# Patient Record
Sex: MALE | Race: WHITE | NOT HISPANIC OR LATINO | Employment: OTHER | URBAN - METROPOLITAN AREA
[De-identification: names, ages, dates, MRNs, and addresses within clinical notes are randomized per-mention and may not be internally consistent; named-entity substitution may affect disease eponyms.]

---

## 2019-06-07 ENCOUNTER — TELEPHONE (OUTPATIENT)
Dept: SCHEDULING | Facility: IMAGING CENTER | Age: 69
End: 2019-06-07

## 2019-06-13 ENCOUNTER — TELEPHONE (OUTPATIENT)
Dept: MEDICAL GROUP | Facility: MEDICAL CENTER | Age: 69
End: 2019-06-13

## 2019-06-13 NOTE — TELEPHONE ENCOUNTER
----- Message from Deanne Sagastume sent at 6/13/2019 12:29 PM PDT -----  Regarding: FW: Health records  Contact: [phone]      ----- Message -----  From: Isaias Dover  Sent: 6/13/2019  12:16 PM  To: Marianela Mills  Subject: Health records                                   Topic: Bill/Statement    I am a new patient of yours with an initial visit scheduled for July 23rd at 2pm.   I am a new resident of [place], having moved from [place], [place].   You will be receiving via fax my medical records from my [place] Primary Physician  Doctor and Dermatologist.   Any questions feel free to contact me [phone].   Thank you,   Isaias

## 2019-06-26 ENCOUNTER — TELEPHONE (OUTPATIENT)
Dept: MEDICAL GROUP | Facility: LAB | Age: 69
End: 2019-06-26

## 2019-06-26 NOTE — TELEPHONE ENCOUNTER
1. Caller Name: Isaias Dover      Call Back Number: [phone] (home)         Patient approves a detailed voicemail message: N\A    Patient called and LVM stating he going to be establishing care in July on the 23, He is still in [place] until early July. He called in regards to getting a referral placed for a podiatrists in [place], he stated about a ingrown toenail.

## 2019-07-03 ENCOUNTER — OFFICE VISIT (OUTPATIENT)
Dept: MEDICAL GROUP | Facility: MEDICAL CENTER | Age: 69
End: 2019-07-03
Payer: MEDICARE

## 2019-07-03 VITALS
WEIGHT: 178.2 LBS | SYSTOLIC BLOOD PRESSURE: 132 MMHG | TEMPERATURE: 98.4 F | RESPIRATION RATE: 16 BRPM | HEART RATE: 71 BPM | OXYGEN SATURATION: 96 % | BODY MASS INDEX: 27.01 KG/M2 | HEIGHT: 68 IN | DIASTOLIC BLOOD PRESSURE: 80 MMHG

## 2019-07-03 DIAGNOSIS — H91.8X3 OTHER SPECIFIED HEARING LOSS OF BOTH EARS: ICD-10-CM

## 2019-07-03 DIAGNOSIS — I10 ESSENTIAL HYPERTENSION: ICD-10-CM

## 2019-07-03 DIAGNOSIS — Z96.641 HISTORY OF TOTAL RIGHT HIP REPLACEMENT: ICD-10-CM

## 2019-07-03 DIAGNOSIS — L60.0 INGROWN NAIL: ICD-10-CM

## 2019-07-03 DIAGNOSIS — Z00.00 PREVENTATIVE HEALTH CARE: ICD-10-CM

## 2019-07-03 DIAGNOSIS — Z12.5 ENCOUNTER FOR SCREENING FOR MALIGNANT NEOPLASM OF PROSTATE: ICD-10-CM

## 2019-07-03 DIAGNOSIS — Z87.19 HISTORY OF PYLORIC STENOSIS AS A CHILD: ICD-10-CM

## 2019-07-03 DIAGNOSIS — E78.5 DYSLIPIDEMIA: ICD-10-CM

## 2019-07-03 DIAGNOSIS — E11.8 TYPE 2 DIABETES MELLITUS WITH COMPLICATION, WITHOUT LONG-TERM CURRENT USE OF INSULIN (HCC): ICD-10-CM

## 2019-07-03 PROBLEM — H91.93 BILATERAL HEARING LOSS: Status: ACTIVE | Noted: 2019-07-03

## 2019-07-03 PROCEDURE — 99204 OFFICE O/P NEW MOD 45 MIN: CPT | Performed by: INTERNAL MEDICINE

## 2019-07-03 RX ORDER — HYDROCHLOROTHIAZIDE 12.5 MG/1
12.5 CAPSULE, GELATIN COATED ORAL EVERY MORNING
COMMUNITY
End: 2019-12-04 | Stop reason: SDUPTHER

## 2019-07-03 RX ORDER — LISINOPRIL 40 MG/1
40 TABLET ORAL DAILY
COMMUNITY
End: 2019-12-04 | Stop reason: SDUPTHER

## 2019-07-03 RX ORDER — ROSUVASTATIN CALCIUM 20 MG/1
20 TABLET, COATED ORAL EVERY EVENING
COMMUNITY
End: 2019-12-04 | Stop reason: SDUPTHER

## 2019-07-03 ASSESSMENT — PATIENT HEALTH QUESTIONNAIRE - PHQ9: CLINICAL INTERPRETATION OF PHQ2 SCORE: 0

## 2019-07-03 NOTE — PROGRESS NOTES
CC:  Diagnoses of Essential hypertension, Type 2 diabetes mellitus with complication, without long-term current use of insulin (HCC), Ingrown nail, Preventative health care, Encounter for screening for malignant neoplasm of prostate, Dyslipidemia, History of total right hip replacement, Other specified hearing loss of both ears, and History of pyloric stenosis as a child were pertinent to this visit.    HISTORY OF THE PRESENT ILLNESS: Patient is a 69 y.o. male. This pleasant patient is here today to establish care he recently moved here from [place].    He indicates he just had his labs done and saw his primary care doctor in [place] on 6/17/2019.  These records have been requested.  He says he did stool cards for colon cancer screening.    Patient says his diabetes is well controlled, he denies any complications.  Has ingrown toenail on the right hallux, saw podiatry in [place] was pending treatment but needed to move here.  With weight loss of approximately 20 pounds, intentionally, he is off Januvia and now just on the metformin.  Indicates that sugars have been reasonably well controlled at home.  Says his eye exam was 3/2019 and was normal.  No hypoglycemia.    History of bilateral hearing loss due to febrile illness at age 7 that was associated with seizure at that time only.  He has had hearing aids since he was a child.    Occasional cigar, he understands no amount of nicotine safe, he will consider forgoing the cigar at his son's pending wedding.  He denies any pulmonary symptoms.  1 to 2 glasses of wine usually night    Allergies: Patient has no allergy information on record.    Current Outpatient Prescriptions Ordered in [facility]   Medication Sig Dispense Refill   • metFORMIN (GLUCOPHAGE) 500 MG Tab Take 500 mg by mouth 2 times a day, with meals.     • lisinopril (PRINIVIL, ZESTRIL) 40 MG tablet Take 40 mg by mouth every day.     • hydrochlorothiazide (MICROZIDE) 12.5 MG capsule Take 12.5 mg by  mouth every morning.     • rosuvastatin (CRESTOR) 20 MG Tab Take 20 mg by mouth every evening.     • aspirin 81 MG tablet Take 81 mg by mouth every day.     • Multiple Vitamins-Minerals (CENTRUM SILVER 50+MEN) Tab Take  by mouth.       No current Epic-ordered facility-administered medications on file.        Past Medical History:   Diagnosis Date   • Diabetes (HCC)    • Hyperlipidemia    • Hypertension        Past Surgical History:   Procedure Laterality Date   • ATHROPLASTY Right     right high    • HERNIA REPAIR      bilateral inguinal, 4 total starting at age 7 thru age 46   • OTHER      sx for pyloric stenosis at 6wk of age       Social History   Substance Use Topics   • Smoking status: Light Tobacco Smoker     Types: Cigars   • Smokeless tobacco: Never Used      Comment: occ.    • Alcohol use 4.2 - 8.4 oz/week     7 - 14 Glasses of wine per week       Social History     Social History Narrative   • No narrative on file       Family History   Problem Relation Age of Onset   • Hypertension Mother    • Hyperlipidemia Mother    • Heart Disease Father    • Hypertension Father    • Cancer Paternal Grandfather         oral cancer/tobacco       ROS:     - Constitutional: Negative for fever, chills, unexpected weight change, night sweats    - Eyes:   Negative for blurry vision, eye pain, discharge    - ENT:  Negative for hearing changes, ear pain, ear discharge, rhinorrhea, sinus congestion, sore throat     - Respiratory: Negative for cough, sputum production, chest congestion, dyspnea, wheezing, and crackles.      - Cardiovascular: Negative for chest pain, palpitations, orthopnea, and bilateral lower extremity edema.     - Gastrointestinal: Negative for heartburn, nausea, vomiting, abdominal pain, hematochezia, melena, diarrhea, constipation, and greasy/foul-smelling stools.     - Genitourinary: Negative for dysuria, polyuria, hematuria, pyuria, urinary urgency, and urinary incontinence.     -  "Musculoskeletal:toenail/hallux as per hpi    - Skin: Negative for rash, itching, cyanotic skin color change.     - Neurological: Negative for migraines, numbness, ataxia, tremors, vertigo    - Endo:Negative for polyuria, heat/cold intolerance, excessive thirst    - Hem/lymphatic: Negative for easy bruising, blood clots, lymphedema, swollen glands    -Allergic/immun: egative for allergic rhinitis    - Psychiatric/Behavioral: Negative for depression, suicidal/homicidal ideation and memory loss.      Exam: /80 (BP Location: Left arm, Patient Position: Sitting, BP Cuff Size: Adult)   Pulse 71   Temp 36.9 °C (98.4 °F) (Temporal)   Resp 16   Ht 1.715 m (5' 7.52\")   Wt 80.8 kg (178 lb 3.2 oz)   SpO2 96%  Body mass index is 27.48 kg/m².    General: Normal appearing. No distress.  EYES: Conjunctiva clear lids without ptosis, pupils equal  EARS: Normal shape and contour   NOSE, THROAT: nasal mucosa benign. oropharynx is without erythema, edema or exudates.   Neck: Supple without LAD. Thyroid is not enlarged.  Pulmonary: Clear to ausculation.  Normal effort. No rales or wheezing.  Cardiovascular: Regular rate and rhythm without significant murmur.   Abdomen: Soft, nontender, nondistended. Normal bowel sounds.  Neurologic: Cranial nerves grossly nonfocal.  Monofilament testing intact throughout,   Lymph: No cervical, supraclavicular nodes palpable  Skin: Warm and dry.  No obvious lesions.  Musculoskeletal: Normal gait. No extremity cyanosis, clubbing, or edema. distal pedal and posterior tibial pulses intact.  No foot sores.  There is a little bit of ingrown toenail on the right hallux laterally.  There is no associated redness or signs of cellulitis  Psych: Normal mood and affect. Alert and oriented x3. Judgment and insight is normal.    Assessment/Plan  1. Essential hypertension  Historically stable, controlled medical condition continue hydrochlorothiazide 12.5 mg daily and lisinopril 40 mg daily.    2. Type 2 " diabetes mellitus with complication, without long-term current use of insulin (HCC)  Patient indicates this is a controlled medical condition on metformin.  Retinal exam -3/19 per patient.  Monofilament testing today 7/3/2019 normal.  Prior records have been requested, he says he just had labs done in June, patient advised for medication refills we will need labs in our system.  - CBC WITH DIFFERENTIAL; Future  - Comp Metabolic Panel; Future  - Lipid Profile; Future  - HEMOGLOBIN A1C; Future  - REFERRAL TO OPHTHALMOLOGY    3. Ingrown nail  Recommend tea tree oil in the interim.  - REFERRAL TO PODIATRY    4. Preventative health care  - CBC WITH DIFFERENTIAL; Future  - Comp Metabolic Panel; Future  - Lipid Profile; Future  - VITAMIN D,25 HYDROXY; Future    5. Encounter for screening for malignant neoplasm of prostate  - PROSTATE SPECIFIC AG SCREENING; Future    6. Dyslipidemia  Historically stable, well controlled issue continue rosuvastatin 20 mg daily.    7. History of total right hip replacement  Stable, prior issue, no symptoms.    8. Other specified hearing loss of both ears  Stable, chronic issue, uses hearing aids.    9. History of pyloric stenosis as a child  This is a remote issue that resolved after surgery as a child.  Asymptomatic.        Return to clinic 6 months or sooner if needed      Please note that this dictation was created using voice recognition software. I have made every reasonable attempt to correct obvious errors, but I expect that there are errors of grammar and possibly content that I did not discover before finalizing the note.

## 2019-10-29 ENCOUNTER — HOSPITAL ENCOUNTER (OUTPATIENT)
Dept: LAB | Facility: MEDICAL CENTER | Age: 69
End: 2019-10-29
Attending: INTERNAL MEDICINE
Payer: MEDICARE

## 2019-10-29 DIAGNOSIS — Z00.00 PREVENTATIVE HEALTH CARE: ICD-10-CM

## 2019-10-29 DIAGNOSIS — Z12.5 ENCOUNTER FOR SCREENING FOR MALIGNANT NEOPLASM OF PROSTATE: ICD-10-CM

## 2019-10-29 DIAGNOSIS — E11.8 TYPE 2 DIABETES MELLITUS WITH COMPLICATION, WITHOUT LONG-TERM CURRENT USE OF INSULIN (HCC): ICD-10-CM

## 2019-10-29 LAB
25(OH)D3 SERPL-MCNC: 29 NG/ML (ref 30–100)
ALBUMIN SERPL BCP-MCNC: 4.8 G/DL (ref 3.2–4.9)
ALBUMIN/GLOB SERPL: 1.5 G/DL
ALP SERPL-CCNC: 56 U/L (ref 30–99)
ALT SERPL-CCNC: 50 U/L (ref 2–50)
ANION GAP SERPL CALC-SCNC: 12 MMOL/L (ref 0–11.9)
AST SERPL-CCNC: 40 U/L (ref 12–45)
BASOPHILS # BLD AUTO: 0.5 % (ref 0–1.8)
BASOPHILS # BLD: 0.03 K/UL (ref 0–0.12)
BILIRUB SERPL-MCNC: 0.7 MG/DL (ref 0.1–1.5)
BUN SERPL-MCNC: 16 MG/DL (ref 8–22)
CALCIUM SERPL-MCNC: 9.9 MG/DL (ref 8.5–10.5)
CHLORIDE SERPL-SCNC: 102 MMOL/L (ref 96–112)
CHOLEST SERPL-MCNC: 162 MG/DL (ref 100–199)
CO2 SERPL-SCNC: 25 MMOL/L (ref 20–33)
CREAT SERPL-MCNC: 0.81 MG/DL (ref 0.5–1.4)
EOSINOPHIL # BLD AUTO: 0.02 K/UL (ref 0–0.51)
EOSINOPHIL NFR BLD: 0.3 % (ref 0–6.9)
ERYTHROCYTE [DISTWIDTH] IN BLOOD BY AUTOMATED COUNT: 42.1 FL (ref 35.9–50)
FASTING STATUS PATIENT QL REPORTED: NORMAL
GLOBULIN SER CALC-MCNC: 3.1 G/DL (ref 1.9–3.5)
GLUCOSE SERPL-MCNC: 104 MG/DL (ref 65–99)
HCT VFR BLD AUTO: 45.8 % (ref 42–52)
HDLC SERPL-MCNC: 52 MG/DL
HGB BLD-MCNC: 15.3 G/DL (ref 14–18)
IMM GRANULOCYTES # BLD AUTO: 0.02 K/UL (ref 0–0.11)
IMM GRANULOCYTES NFR BLD AUTO: 0.3 % (ref 0–0.9)
LDLC SERPL CALC-MCNC: 88 MG/DL
LYMPHOCYTES # BLD AUTO: 1.61 K/UL (ref 1–4.8)
LYMPHOCYTES NFR BLD: 25.3 % (ref 22–41)
MCH RBC QN AUTO: 30.7 PG (ref 27–33)
MCHC RBC AUTO-ENTMCNC: 33.4 G/DL (ref 33.7–35.3)
MCV RBC AUTO: 92 FL (ref 81.4–97.8)
MONOCYTES # BLD AUTO: 0.46 K/UL (ref 0–0.85)
MONOCYTES NFR BLD AUTO: 7.2 % (ref 0–13.4)
NEUTROPHILS # BLD AUTO: 4.23 K/UL (ref 1.82–7.42)
NEUTROPHILS NFR BLD: 66.4 % (ref 44–72)
NRBC # BLD AUTO: 0 K/UL
NRBC BLD-RTO: 0 /100 WBC
PLATELET # BLD AUTO: 253 K/UL (ref 164–446)
PMV BLD AUTO: 10.4 FL (ref 9–12.9)
POTASSIUM SERPL-SCNC: 4.1 MMOL/L (ref 3.6–5.5)
PROT SERPL-MCNC: 7.9 G/DL (ref 6–8.2)
PSA SERPL-MCNC: 5.61 NG/ML (ref 0–4)
RBC # BLD AUTO: 4.98 M/UL (ref 4.7–6.1)
SODIUM SERPL-SCNC: 139 MMOL/L (ref 135–145)
TRIGL SERPL-MCNC: 111 MG/DL (ref 0–149)
WBC # BLD AUTO: 6.4 K/UL (ref 4.8–10.8)

## 2019-10-29 PROCEDURE — 85025 COMPLETE CBC W/AUTO DIFF WBC: CPT

## 2019-10-29 PROCEDURE — 80061 LIPID PANEL: CPT

## 2019-10-29 PROCEDURE — 80053 COMPREHEN METABOLIC PANEL: CPT

## 2019-10-29 PROCEDURE — 82306 VITAMIN D 25 HYDROXY: CPT | Mod: GA

## 2019-10-29 PROCEDURE — 36415 COLL VENOUS BLD VENIPUNCTURE: CPT | Mod: GA

## 2019-10-29 PROCEDURE — 84153 ASSAY OF PSA TOTAL: CPT | Mod: GA

## 2019-10-29 PROCEDURE — 83036 HEMOGLOBIN GLYCOSYLATED A1C: CPT | Mod: GA

## 2019-10-30 DIAGNOSIS — R97.20 ELEVATED PSA: ICD-10-CM

## 2019-10-30 LAB
EST. AVERAGE GLUCOSE BLD GHB EST-MCNC: 157 MG/DL
HBA1C MFR BLD: 7.1 % (ref 0–5.6)

## 2019-12-03 ENCOUNTER — HOSPITAL ENCOUNTER (OUTPATIENT)
Dept: LAB | Facility: MEDICAL CENTER | Age: 69
End: 2019-12-03
Attending: INTERNAL MEDICINE
Payer: MEDICARE

## 2019-12-03 DIAGNOSIS — R97.20 ELEVATED PSA: ICD-10-CM

## 2019-12-03 PROCEDURE — 84153 ASSAY OF PSA TOTAL: CPT

## 2019-12-03 PROCEDURE — 36415 COLL VENOUS BLD VENIPUNCTURE: CPT

## 2019-12-03 PROCEDURE — 84154 ASSAY OF PSA FREE: CPT

## 2019-12-04 ENCOUNTER — OFFICE VISIT (OUTPATIENT)
Dept: MEDICAL GROUP | Facility: MEDICAL CENTER | Age: 69
End: 2019-12-04
Payer: MEDICARE

## 2019-12-04 VITALS
DIASTOLIC BLOOD PRESSURE: 62 MMHG | WEIGHT: 186.2 LBS | HEIGHT: 68 IN | BODY MASS INDEX: 28.22 KG/M2 | HEART RATE: 71 BPM | RESPIRATION RATE: 16 BRPM | TEMPERATURE: 98.6 F | SYSTOLIC BLOOD PRESSURE: 162 MMHG | OXYGEN SATURATION: 95 %

## 2019-12-04 DIAGNOSIS — E78.5 DYSLIPIDEMIA: ICD-10-CM

## 2019-12-04 DIAGNOSIS — I10 ESSENTIAL HYPERTENSION: ICD-10-CM

## 2019-12-04 DIAGNOSIS — Z23 NEED FOR VACCINATION: ICD-10-CM

## 2019-12-04 DIAGNOSIS — R97.20 ELEVATED PSA: ICD-10-CM

## 2019-12-04 DIAGNOSIS — E11.8 TYPE 2 DIABETES MELLITUS WITH COMPLICATION, WITHOUT LONG-TERM CURRENT USE OF INSULIN (HCC): ICD-10-CM

## 2019-12-04 PROCEDURE — 99214 OFFICE O/P EST MOD 30 MIN: CPT | Mod: 25 | Performed by: INTERNAL MEDICINE

## 2019-12-04 PROCEDURE — 90715 TDAP VACCINE 7 YRS/> IM: CPT | Performed by: INTERNAL MEDICINE

## 2019-12-04 PROCEDURE — 90471 IMMUNIZATION ADMIN: CPT | Performed by: INTERNAL MEDICINE

## 2019-12-04 RX ORDER — ROSUVASTATIN CALCIUM 20 MG/1
20 TABLET, COATED ORAL EVERY EVENING
Qty: 90 TAB | Refills: 3 | Status: SHIPPED | OUTPATIENT
Start: 2019-12-04 | End: 2020-12-06

## 2019-12-04 RX ORDER — HYDROCHLOROTHIAZIDE 12.5 MG/1
12.5 CAPSULE, GELATIN COATED ORAL EVERY MORNING
Qty: 90 CAP | Refills: 3 | Status: SHIPPED | OUTPATIENT
Start: 2019-12-04 | End: 2020-01-14 | Stop reason: SDUPTHER

## 2019-12-04 RX ORDER — LISINOPRIL 40 MG/1
40 TABLET ORAL DAILY
Qty: 90 TAB | Refills: 3 | Status: SHIPPED | OUTPATIENT
Start: 2019-12-04 | End: 2020-12-06

## 2019-12-04 NOTE — PROGRESS NOTES
"CC:  Diagnoses of Need for vaccination, Essential hypertension, Type 2 diabetes mellitus with complication, without long-term current use of insulin (HCC), Dyslipidemia, and Elevated PSA were pertinent to this visit.    HISTORY OF THE PRESENT ILLNESS: Patient is a 69 y.o. male. This pleasant patient is here today to f/u.    Patient has brought in his glucose averages, month of September 159, October 178, November 159.  No sugar lows, foot sores or neuropathy.  He actually is taking metformin thousand milligrams twice daily.  He is interested in adding Jardiance.  A1c on labs 7.1 on 10/29/2019.    Labs 10/9/2019 total cholesterol 162, triglycerides 111, HDL 52, LDL 88.  He is tolerating rosuvastatin very well.    Blood pressure was elevated today in clinic.  He states compliance with lisinopril, hydrochlorothiazide.  He is not on any \"nonsedating \"over-the-counter medications.  He tries to limit adding salt to his food.  Weight has remained the same.  Denies any cardiopulmonary symptoms, plays golf without any angina on exertion.    Elevated PSA on labs 10/29/2019 level 5.61.  His repeat lab is pending with the free and total portion.  He denies any trouble with urinary evacuation, no stream issues, no dysuria.  Does say sometimes he has some dribbling post void which is not new.    Vitamin D level 29, he is taking a supplement.    Allergies: Patient has no known allergies.    Current Outpatient Medications Ordered in Epic   Medication Sig Dispense Refill   • lisinopril (PRINIVIL) 40 MG tablet Take 1 Tab by mouth every day. 90 Tab 3   • rosuvastatin (CRESTOR) 20 MG Tab Take 1 Tab by mouth every evening. 90 Tab 3   • hydrochlorothiazide (MICROZIDE) 12.5 MG capsule Take 1 Cap by mouth every morning. 90 Cap 3   • metformin (GLUCOPHAGE) 1000 MG tablet Take 1 Tab by mouth 2 times a day, with meals. 180 Tab 1   • Empagliflozin (JARDIANCE) 10 MG Tab Take 1 Tab by mouth every day. 90 Tab 1   • aspirin 81 MG tablet Take 81 mg " by mouth every day.     • Multiple Vitamins-Minerals (CENTRUM SILVER 50+MEN) Tab Take  by mouth.       No current Epic-ordered facility-administered medications on file.        Past Medical History:   Diagnosis Date   • Diabetes (HCC)    • Hyperlipidemia    • Hypertension        Past Surgical History:   Procedure Laterality Date   • ATHROPLASTY Right     right high    • HERNIA REPAIR      bilateral inguinal, 4 total starting at age 7 thru age 46   • OTHER      sx for pyloric stenosis at 6wk of age       Social History     Tobacco Use   • Smoking status: Light Tobacco Smoker     Packs/day: 0.00     Types: Cigars   • Smokeless tobacco: Never Used   • Tobacco comment: occ.    Substance Use Topics   • Alcohol use: Yes     Alcohol/week: 4.2 - 8.4 oz     Types: 7 - 14 Glasses of wine per week   • Drug use: No       Social History     Patient does not qualify to have social determinant information on file (likely too young).   Social History Narrative   • Not on file       Family History   Problem Relation Age of Onset   • Hypertension Mother    • Hyperlipidemia Mother    • Heart Disease Father    • Hypertension Father    • Cancer Paternal Grandfather         oral cancer/tobacco       ROS:     - Constitutional: Negative for fever, chills, unexpected weight change, night sweats    - Eyes:   Negative for blurry vision, eye pain, discharge    - ENT:  Negative for hearing changes, ear pain, ear discharge, rhinorrhea, sinus congestion, sore throat     - Respiratory: Negative for cough, sputum production, chest congestion, dyspnea, wheezing, and crackles.      - Cardiovascular: Negative for chest pain, palpitations, orthopnea, and bilateral lower extremity edema.     - Gastrointestinal: Negative for heartburn, nausea, vomiting, abdominal pain, hematochezia, melena, diarrhea, constipation, and greasy/foul-smelling stools.     - Genitourinary: Negative for dysuria, polyuria, hematuria, pyuria, urinary urgency, and urinary  "incontinence.     - Musculoskeletal: Negative for myalgias, back pain, and joint pain.     - Skin: Negative for rash, itching, cyanotic skin color change.     - Neurological: Negative for migraines, numbness, ataxia, tremors, vertigo    - Endo:Negative for polyuria, heat/cold intolerance, excessive thirst    - Hem/lymphatic: Negative for easy bruising, blood clots, lymphedema, swollen glands    -Allergic/immun: Negative for allergic rhinitis    - Psychiatric/Behavioral: Negative for depression, suicidal/homicidal ideation and memory loss.      Exam: BP (!) 172/90 (BP Location: Left arm, Patient Position: Sitting, BP Cuff Size: Adult)   Pulse 71   Temp 37 °C (98.6 °F) (Temporal)   Resp 16   Ht 1.715 m (5' 7.52\")   Wt 84.5 kg (186 lb 3.2 oz)   SpO2 95%  Body mass index is 28.72 kg/m².    General: Normal appearing. No distress.  EYES: Conjunctiva clear lids without ptosis, pupils equal  EARS: Normal shape and contour   NOSE, THROAT: nasal mucosa benign. oropharynx is without erythema, edema or exudates.   Neck: Supple without LAD. Thyroid is not enlarged.  Pulmonary: Clear to ausculation.  Normal effort. No rales or wheezing.  Cardiovascular: Regular rate and rhythm without significant murmur.   Abdomen: Soft, nontender, nondistended. Normal bowel sounds.  Neurologic: Cranial nerves grossly nonfocal  Lymph: No cervical, supraclavicular nodes palpable  Skin: Warm and dry.  No obvious lesions.  Musculoskeletal: Normal gait. No extremity cyanosis, clubbing, or edema.  Psych: Normal mood and affect. Alert and oriented x3. Judgment and insight is normal.        Assessment/Plan  1. Need for vaccination  - TDAP VACCINE =>8YO IM    2. Essential hypertension  Recheck blood pressure did decrease significantly.  Patient will check his blood pressure at home and report measurements to me by chart email.  Patient did not wish to increase medication at this time but will consider next appointment blood pressure remains " elevated.  Overall stable chronic issue.  - lisinopril (PRINIVIL) 40 MG tablet; Take 1 Tab by mouth every day.  Dispense: 90 Tab; Refill: 3  - hydrochlorothiazide (MICROZIDE) 12.5 MG capsule; Take 1 Cap by mouth every morning.  Dispense: 90 Cap; Refill: 3  - Basic Metabolic Panel; Future    3. Type 2 diabetes mellitus with complication, without long-term current use of insulin (HCC)  Not optimally controlled we will add SGLT2 type medication follow-up 1 month.  Advised patient stay very hydrated.  Advised patient not to let any urine sit on his skin or risk of mycotic genital infections discussed him in signs and symptoms to monitor for.  - HEMOGLOBIN A1C; Future  - MICROALBUMIN CREAT RATIO URINE; Future  - Basic Metabolic Panel; Future  - metformin (GLUCOPHAGE) 1000 MG tablet; Take 1 Tab by mouth 2 times a day, with meals.  Dispense: 180 Tab; Refill: 1  - Empagliflozin (JARDIANCE) 10 MG Tab; Take 1 Tab by mouth every day.  Dispense: 90 Tab; Refill: 1    4. Dyslipidemia  Stable, chronic controlled condition continue rosuvastatin  - rosuvastatin (CRESTOR) 20 MG Tab; Take 1 Tab by mouth every evening.  Dispense: 90 Tab; Refill: 3    5. Elevated PSA  Pending repeat lab with free and total levels, if elevated will refer to urology.  Otherwise will perform rectal exam next appointment if returns normal.      Return to clinic in January        Please note that this dictation was created using voice recognition software. I have made every reasonable attempt to correct obvious errors, but I expect that there are errors of grammar and possibly content that I did not discover before finalizing the note.

## 2019-12-05 LAB
PSA FREE MFR SERPL: 10 %
PSA FREE SERPL-MCNC: 0.4 NG/ML
PSA SERPL-MCNC: 4.1 NG/ML (ref 0–4)

## 2020-01-14 ENCOUNTER — OFFICE VISIT (OUTPATIENT)
Dept: MEDICAL GROUP | Facility: MEDICAL CENTER | Age: 70
End: 2020-01-14
Payer: MEDICARE

## 2020-01-14 VITALS
TEMPERATURE: 98.5 F | DIASTOLIC BLOOD PRESSURE: 76 MMHG | WEIGHT: 183.6 LBS | RESPIRATION RATE: 16 BRPM | OXYGEN SATURATION: 96 % | SYSTOLIC BLOOD PRESSURE: 142 MMHG | HEIGHT: 68 IN | HEART RATE: 79 BPM | BODY MASS INDEX: 27.83 KG/M2

## 2020-01-14 DIAGNOSIS — I10 ESSENTIAL HYPERTENSION: ICD-10-CM

## 2020-01-14 DIAGNOSIS — E13.319 RETINOPATHY DUE TO SECONDARY DM (HCC): ICD-10-CM

## 2020-01-14 DIAGNOSIS — R97.20 ELEVATED PSA: ICD-10-CM

## 2020-01-14 DIAGNOSIS — E11.8 TYPE 2 DIABETES MELLITUS WITH COMPLICATION, WITHOUT LONG-TERM CURRENT USE OF INSULIN (HCC): ICD-10-CM

## 2020-01-14 DIAGNOSIS — Z23 NEED FOR VACCINATION: ICD-10-CM

## 2020-01-14 DIAGNOSIS — Z12.11 SCREENING FOR COLON CANCER: ICD-10-CM

## 2020-01-14 PROCEDURE — 90670 PCV13 VACCINE IM: CPT | Performed by: INTERNAL MEDICINE

## 2020-01-14 PROCEDURE — G0009 ADMIN PNEUMOCOCCAL VACCINE: HCPCS | Performed by: INTERNAL MEDICINE

## 2020-01-14 PROCEDURE — 99214 OFFICE O/P EST MOD 30 MIN: CPT | Mod: 25 | Performed by: INTERNAL MEDICINE

## 2020-01-14 RX ORDER — HYDROCHLOROTHIAZIDE 12.5 MG/1
12.5 CAPSULE, GELATIN COATED ORAL EVERY MORNING
Qty: 90 CAP | Refills: 3 | Status: SHIPPED | OUTPATIENT
Start: 2020-01-14 | End: 2021-02-26

## 2020-01-14 RX ORDER — LANCETS 30 GAUGE
EACH MISCELLANEOUS
Qty: 100 EACH | Refills: 0 | Status: SHIPPED | OUTPATIENT
Start: 2020-01-14 | End: 2024-02-07 | Stop reason: SDUPTHER

## 2020-01-14 RX ORDER — AMLODIPINE BESYLATE 5 MG/1
5 TABLET ORAL DAILY
Qty: 90 TAB | Refills: 3 | Status: SHIPPED | OUTPATIENT
Start: 2020-01-14 | End: 2020-12-16

## 2020-01-14 ASSESSMENT — PATIENT HEALTH QUESTIONNAIRE - PHQ9: CLINICAL INTERPRETATION OF PHQ2 SCORE: 0

## 2020-01-14 NOTE — PROGRESS NOTES
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    HISTORY OF THE PRESENT ILLNESS: Patient is a 69 y.o. male. This pleasant patient is here today to follow-up.      Last appointment we started Jardiance and this has been tolerated really well.  He is staying hydrated.  No problems with genital mycotic infections.  Overall his weight is trending down a little bit, and so is his blood pressure.  Overall blood pressure remains elevated, similar to clinic.  He is on maximum dose lisinopril also hydrochlorothiazide.  He does not recall ever having leg swelling prior to hydrochlorothiazide and he has no bothersome urinary frequency.  No foot sores or neuropathy.  No hypoglycemia or cardiopulmonary symptoms that he is concerned about.  He had his retinal eye exam 12/5/2019 Dr. Bello which showed no retinopathy in the left eye but mild retinopathy in the right eye.    For health maintenance he will get his pneumococcal vaccine updated today.  He thinks he is due for colonoscopy, this will be coordinated for spring.  He has no current GI symptom concerns.  States he did have hepatitis B vaccine while living in [place].    With the recent elevated PSA he is pending urology appointment today.    Allergies: Patient has no known allergies.    Current Outpatient Medications Ordered in Epic   Medication Sig Dispense Refill   • Empagliflozin (JARDIANCE) 25 MG Tab Take 1 Tab by mouth every day. 90 Tab 3   • amLODIPine (NORVASC) 5 MG Tab Take 1 Tab by mouth every day. 90 Tab 3   • hydrochlorothiazide (MICROZIDE) 12.5 MG capsule Take 1 Cap by mouth every morning. 90 Cap 3   • Blood Glucose Meter Kit Test blood sugar as recommended by provider. One Touch Ultra blood glucose monitoring kit. 1 Kit 0   • Blood  Glucose Test Strips Use one One Touch Ultra strip to test blood sugar once daily . 100 Strip 0   • Lancets Use one One Touch Ultra lancet to test blood sugar once daily . 100 Each 0   • lisinopril (PRINIVIL) 40 MG tablet Take 1 Tab by mouth every day. 90 Tab 3   • rosuvastatin (CRESTOR) 20 MG Tab Take 1 Tab by mouth every evening. 90 Tab 3   • metformin (GLUCOPHAGE) 1000 MG tablet Take 1 Tab by mouth 2 times a day, with meals. 180 Tab 1   • aspirin 81 MG tablet Take 81 mg by mouth every day.     • Multiple Vitamins-Minerals (CENTRUM SILVER 50+MEN) Tab Take  by mouth.       No current Epic-ordered facility-administered medications on file.        Past Medical History:   Diagnosis Date   • Diabetes (HCC)    • Hyperlipidemia    • Hypertension        Past Surgical History:   Procedure Laterality Date   • ATHROPLASTY Right     right high    • HERNIA REPAIR      bilateral inguinal, 4 total starting at age 7 thru age 46   • OTHER      sx for pyloric stenosis at 6wk of age       Social History     Tobacco Use   • Smoking status: Light Tobacco Smoker     Packs/day: 0.00     Types: Cigars   • Smokeless tobacco: Never Used   • Tobacco comment: occ.    Substance Use Topics   • Alcohol use: Yes     Alcohol/week: 4.2 - 8.4 oz     Types: 7 - 14 Glasses of wine per week   • Drug use: No       Social History     Patient does not qualify to have social determinant information on file (likely too young).   Social History Narrative   • Not on file       Family History   Problem Relation Age of Onset   • Hypertension Mother    • Hyperlipidemia Mother    • Heart Disease Father    • Hypertension Father    • Cancer Paternal Grandfather         oral cancer/tobacco       ROS:     - Constitutional: Negative for fever, chills, unexpected weight change, night sweats    - Eyes:   Negative for blurry vision, eye pain, discharge    - ENT:  Negative for hearing changes, ear pain, ear discharge, rhinorrhea, sinus congestion, sore throat     -  "Respiratory: Negative for cough, sputum production, chest congestion, dyspnea, wheezing, and crackles.      - Cardiovascular: Negative for chest pain, palpitations, orthopnea, and bilateral lower extremity edema.     - Gastrointestinal: Negative for heartburn, nausea, vomiting, abdominal pain, hematochezia, melena, diarrhea, constipation, and greasy/foul-smelling stools.     - Genitourinary: Negative for dysuria, polyuria, hematuria, pyuria, urinary urgency, and urinary incontinence.     - Musculoskeletal: Negative for myalgias, back pain, and joint pain.     - Skin: Negative for rash, itching, cyanotic skin color change.     - Neurological: Negative for migraines, numbness, ataxia, tremors, vertigo    - Endo:Negative for polyuria, heat/cold intolerance, excessive thirst    - Hem/lymphatic: Negative for easy bruising, blood clots, lymphedema, swollen glands    -Allergic/immun: Negative for allergic rhinitis    - Psychiatric/Behavioral: Negative for depression, suicidal/homicidal ideation and memory loss.      Exam: /76 (BP Location: Left arm, Patient Position: Sitting, BP Cuff Size: Adult)   Pulse 79   Temp 36.9 °C (98.5 °F) (Temporal)   Resp 16   Ht 1.715 m (5' 7.52\")   Wt 83.3 kg (183 lb 9.6 oz)   SpO2 96%  Body mass index is 28.31 kg/m².    General: Normal appearing. No distress.  EYES: Conjunctiva clear lids without ptosis, pupils equal  EARS: Normal shape and contour   NOSE, THROAT: nasal mucosa benign. oropharynx is without erythema, edema or exudates.   Neck: Supple without LAD. Thyroid is not enlarged.  Pulmonary: Clear to ausculation.  Normal effort. No rales or wheezing.  Cardiovascular: Regular rate and rhythm without significant murmur.   Abdomen: Soft, nontender, nondistended. Normal bowel sounds.  Neurologic: Cranial nerves grossly nonfocal  Lymph: No cervical, supraclavicular nodes palpable  Skin: Warm and dry.  No obvious lesions.  Musculoskeletal: Normal gait. No extremity cyanosis, " clubbing, or edema.  Psych: Normal mood and affect. Alert and oriented x3. Judgment and insight is normal.        Assessment/Plan  1. Need for vaccination  - Pneumococcal Conjugate Vaccine 13-Valent IM    2. Type 2 diabetes mellitus with complication, without long-term current use of insulin (HCC)  Improving control based on home glucose logs.  Monofilament testing today normal sensation, normal pulses and no sores.  Retinal exam is up-to-date from December 2019 showing some mild retinopathy in one eye.  - Empagliflozin (JARDIANCE) 25 MG Tab; Take 1 Tab by mouth every day.  Dispense: 90 Tab; Refill: 3  - Blood Glucose Meter Kit; Test blood sugar as recommended by provider. One Touch Ultra blood glucose monitoring kit.  Dispense: 1 Kit; Refill: 0  - Blood Glucose Test Strips; Use one One Touch Ultra strip to test blood sugar once daily .  Dispense: 100 Strip; Refill: 0  - Lancets; Use one One Touch Ultra lancet to test blood sugar once daily .  Dispense: 100 Each; Refill: 0  - Diabetic Monofilament Lower Extremity Exam    3. Essential hypertension  Not optimally controlled we will continue lisinopril 40 mg, hydrochlorothiazide and start amlodipine 5 mg.  Patient will monitor for goal 120/80 or less.  - amLODIPine (NORVASC) 5 MG Tab; Take 1 Tab by mouth every day.  Dispense: 90 Tab; Refill: 3  - hydrochlorothiazide (MICROZIDE) 12.5 MG capsule; Take 1 Cap by mouth every morning.  Dispense: 90 Cap; Refill: 3    4. Screening for colon cancer  Patient reports due for screening.  - REFERRAL TO GI FOR COLONOSCOPY      Return to clinic approximately 2 months        Please note that this dictation was created using voice recognition software. I have made every reasonable attempt to correct obvious errors, but I expect that there are errors of grammar and possibly content that I did not discover before finalizing the note.

## 2020-03-16 ENCOUNTER — TELEPHONE (OUTPATIENT)
Dept: MEDICAL GROUP | Facility: MEDICAL CENTER | Age: 70
End: 2020-03-16

## 2020-03-16 NOTE — TELEPHONE ENCOUNTER
VOICEMAIL  1. Caller Name: Ger Dover                        Call Back Number: [phone]      2. Message: Left VM for pt to cb    3. Patient approves office to leave a detailed voicemail/MyChart message: N\A

## 2020-03-18 ENCOUNTER — APPOINTMENT (OUTPATIENT)
Dept: MEDICAL GROUP | Facility: MEDICAL CENTER | Age: 70
End: 2020-03-18
Payer: COMMERCIAL

## 2020-05-13 DIAGNOSIS — E11.8 TYPE 2 DIABETES MELLITUS WITH COMPLICATION, WITHOUT LONG-TERM CURRENT USE OF INSULIN (HCC): ICD-10-CM

## 2020-10-29 ENCOUNTER — TELEMEDICINE (OUTPATIENT)
Dept: MEDICAL GROUP | Facility: MEDICAL CENTER | Age: 70
End: 2020-10-29
Payer: MEDICARE

## 2020-10-29 VITALS — RESPIRATION RATE: 16 BRPM | BODY MASS INDEX: 26.68 KG/M2 | WEIGHT: 166 LBS | HEIGHT: 66 IN

## 2020-10-29 DIAGNOSIS — E78.5 DYSLIPIDEMIA: ICD-10-CM

## 2020-10-29 DIAGNOSIS — E13.319 RETINOPATHY DUE TO SECONDARY DM (HCC): ICD-10-CM

## 2020-10-29 DIAGNOSIS — I10 ESSENTIAL HYPERTENSION: ICD-10-CM

## 2020-10-29 DIAGNOSIS — Z11.59 ENCOUNTER FOR HEPATITIS C SCREENING TEST FOR LOW RISK PATIENT: ICD-10-CM

## 2020-10-29 DIAGNOSIS — E11.8 TYPE 2 DIABETES MELLITUS WITH COMPLICATION, WITHOUT LONG-TERM CURRENT USE OF INSULIN (HCC): ICD-10-CM

## 2020-10-29 DIAGNOSIS — R97.20 ELEVATED PSA: ICD-10-CM

## 2020-10-29 PROCEDURE — 99214 OFFICE O/P EST MOD 30 MIN: CPT | Mod: 95,CR | Performed by: INTERNAL MEDICINE

## 2020-10-29 ASSESSMENT — FIBROSIS 4 INDEX: FIB4 SCORE: 1.542778431679740053

## 2020-10-29 NOTE — PROGRESS NOTES
"Telemedicine: Established Patient   This evaluation was conducted via doximetry using secure and encrypted videoconferencing technology. The patient was in a private location in the state of [place], [place].    The patient's identity was confirmed and verbal consent was obtained for this virtual visit.    Subjective:   CC:   Chief Complaint   Patient presents with   • Lab Results   • Follow-Up       Ger Dover is a 70 y.o. male presenting for evaluation and management of:    Ger is currently in [place], has been there since June, hopes to return to [place] in January.  His schedule got disturbed due to the pandemic.    Overall he says, \"I feel great.  \"    Though he says since our last appointment in January he has made a lot of progress, he totally quit cigars.    His diabetes significantly improved with addition of Jardiance he says he is also happy to report he continues to slowly lose weight.  Weight January 183 pounds most recently 166 pounds.  He says his fasting glucose can be anywhere from 120 up to 160.  He notices when he has a vodka tonic and 1 or 2 glasses of wine the night before his glucose is higher so he plans to cut down.  Otherwise he is trying to have very healthy diet, exercise as tolerated.  His diabetic eye exam is pending in [place] 12/16/2020.  His last eye report 12/5/2019 showed no retinopathy in left eye but mild retinopathy of the right eye.  No acute vision changes.  Blood pressure at home systolic 110-120, diastolic 60-70s.    He has since followed up with his PCP in [place] Dr. Cory [facility].  Labs there showed A1c 6.3, patient reports cholesterol is just 118, PSA elevated 5.8, BMP assume normal from 9/20/2020.    For the elevated PSA he did see Dr. Barillas also at [facility] who was able to review his last 8 years of PSA measurements and this time plan is to do MRI of the prostate and repeat PSA around January.    Additionally he had concern over " forgetfulness but he tells me that his last 2 neuropsychological tests were reviewed and saw nothing for concerned including report from 2017.  He says he is currently pending a brain MRI to evaluate for any silent strokes and this is scheduled for 11/30/2020.    He did get his flu shot 9/29/2020 and his first shingles shot as well.  He plans to get his pneumonia vaccine when he is back in [place] as well as a second shingles.    Also has colonoscopy 6/9/2020 which is normal due again in 10 years.    ROS  No fever, chills, cardiopulmonary symptoms, focal strokelike symptoms, or other new symptoms    No Known Allergies    Current medicines (including changes today)  Current Outpatient Medications   Medication Sig Dispense Refill   • metformin (GLUCOPHAGE) 1000 MG tablet TAKE 1 TABLET TWICE A DAY WITH MEALS 180 Tab 3   • Empagliflozin (JARDIANCE) 25 MG Tab Take 1 Tab by mouth every day. 90 Tab 3   • amLODIPine (NORVASC) 5 MG Tab Take 1 Tab by mouth every day. 90 Tab 3   • hydrochlorothiazide (MICROZIDE) 12.5 MG capsule Take 1 Cap by mouth every morning. 90 Cap 3   • Blood Glucose Meter Kit Test blood sugar as recommended by provider. One Touch Ultra blood glucose monitoring kit. 1 Kit 0   • Blood Glucose Test Strips Use one One Touch Ultra strip to test blood sugar once daily . 100 Strip 0   • Lancets Use one One Touch Ultra lancet to test blood sugar once daily . 100 Each 0   • lisinopril (PRINIVIL) 40 MG tablet Take 1 Tab by mouth every day. 90 Tab 3   • rosuvastatin (CRESTOR) 20 MG Tab Take 1 Tab by mouth every evening. 90 Tab 3   • aspirin 81 MG tablet Take 81 mg by mouth every day.     • Multiple Vitamins-Minerals (CENTRUM SILVER 50+MEN) Tab Take  by mouth.       No current facility-administered medications for this visit.        Patient Active Problem List    Diagnosis Date Noted   • Retinopathy due to secondary DM (HCC) 01/14/2020   • Essential hypertension 07/03/2019   • Type 2 diabetes mellitus with  "complication, without long-term current use of insulin (HCC) 07/03/2019   • Preventative health care 07/03/2019   • Dyslipidemia 07/03/2019   • History of total right hip replacement 07/03/2019   • Bilateral hearing loss 07/03/2019   • History of pyloric stenosis as a child 07/03/2019       Family History   Problem Relation Age of Onset   • Hypertension Mother    • Hyperlipidemia Mother    • Heart Disease Father    • Hypertension Father    • Cancer Paternal Grandfather         oral cancer/tobacco       He  has a past medical history of Diabetes (HCC), Hyperlipidemia, and Hypertension.  He  has a past surgical history that includes arthroplasty (Right); other; and hernia repair.       Objective:   Resp 16   Ht 1.676 m (5' 6\")   Wt 75.3 kg (166 lb)   BMI 26.79 kg/m²     Physical Exam  Constitutional: Alert, no distress, well-groomed.  Skin: No rashes in visible areas.  Eye: Round. Conjunctiva clear, lids normal. No icterus.   ENMT: Lips pink without lesions, good dentition, moist mucous membranes. Phonation normal.  Neck: No masses, no thyromegaly. Moves freely without pain.  CV: Pulse as reported by patient  Respiratory: Unlabored respiratory effort, no cough or audible wheeze  Psych: Alert and oriented x3, normal affect and mood.     Assessment and Plan:   The following treatment plan was discussed:     1. Elevated PSA    2. Encounter for hepatitis C screening test for low risk patient    3. Essential hypertension    4. Type 2 diabetes mellitus with complication, without long-term current use of insulin (HCC)    5. Dyslipidemia    6. Retinopathy due to secondary DM (HCC)    He is pending prostate MRI as well as repeat PSA around January for elevated PSA measurement.    He is agreeable to continue preventive health measures including screening for hepatitis C, completing pneumonia and shingles vaccine on his return to [facility].    Diabetes, blood pressure and lipids are doing well, for his history retinopathy has " pending eye exam 12/16/2020.    He will forward me the results of his lab results, imaging, etc. for care coordination when he returns to [facility].    Follow-up: Roughly 3 months when he returns to town

## 2020-12-04 DIAGNOSIS — I10 ESSENTIAL HYPERTENSION: ICD-10-CM

## 2020-12-04 DIAGNOSIS — E78.5 DYSLIPIDEMIA: ICD-10-CM

## 2020-12-06 RX ORDER — ROSUVASTATIN CALCIUM 20 MG/1
TABLET, COATED ORAL
Qty: 90 TAB | Refills: 3 | Status: SHIPPED | OUTPATIENT
Start: 2020-12-06 | End: 2021-10-14 | Stop reason: SDUPTHER

## 2020-12-06 RX ORDER — LISINOPRIL 40 MG/1
TABLET ORAL
Qty: 90 TAB | Refills: 3 | Status: SHIPPED | OUTPATIENT
Start: 2020-12-06 | End: 2021-10-14 | Stop reason: SDUPTHER

## 2020-12-15 DIAGNOSIS — I10 ESSENTIAL HYPERTENSION: ICD-10-CM

## 2020-12-16 DIAGNOSIS — I10 ESSENTIAL HYPERTENSION: ICD-10-CM

## 2020-12-16 RX ORDER — AMLODIPINE BESYLATE 5 MG/1
TABLET ORAL
Qty: 90 TAB | Refills: 3 | Status: SHIPPED | OUTPATIENT
Start: 2020-12-16 | End: 2021-09-20 | Stop reason: SDUPTHER

## 2020-12-16 RX ORDER — AMLODIPINE BESYLATE 5 MG/1
5 TABLET ORAL DAILY
Qty: 90 TAB | Refills: 3 | OUTPATIENT
Start: 2020-12-16

## 2021-03-03 DIAGNOSIS — Z23 NEED FOR VACCINATION: ICD-10-CM

## 2021-05-09 DIAGNOSIS — E11.8 TYPE 2 DIABETES MELLITUS WITH COMPLICATION, WITHOUT LONG-TERM CURRENT USE OF INSULIN (HCC): ICD-10-CM

## 2021-05-13 ENCOUNTER — DOCUMENTATION (OUTPATIENT)
Dept: MEDICAL GROUP | Facility: MEDICAL CENTER | Age: 71
End: 2021-05-13

## 2021-05-14 NOTE — PROGRESS NOTES
"Documentation note, patient left records.    Covid vaccine was Pfizer dated 1/20/2021 and 2/10/2021.  Influenza vaccine dated 2/29/2020    Left records from [facility] showing he is pending brain MRI with and without contrast and he had some additional labs ordered looks like for Lyme disease antibodies at that time. Saurabh Cid. Testing ordered diagnostic code mild cognitive impairment.    Labs show CRP of 1.3 from 10/19/2020. Sed rate of 19 same date. Also testosterone 406 within reference range. Arginine vasopressin hormone less than 0.5. Prolactin 5.67. Cortisol 11.4 at 9:30 AM, within reference range. SPEP showed slightly increased albumin but no other abnormalities. B12 459. B1 within reference range 155.    Patient is B+ blood    A1c 6.3 collected 8/6/2020, triglycerides 76, total cholesterol 118, HDL 50, LDL 53, TSH 1.09, PSA minimally elevated 5.8, normal CBC, EGFR 91.1 electrolytes otherwise also reasonable, normal liver enzymes. That is remainder of the labs. Looks like PCP did ask him about the elevated PSA and is following up on it per email contact patient 8/12/2020 at that time.    Reported neuropsychiatric evaluation dated 11/15/2017 showing improvements and diagnosis of mild cognitive impairment was \"premature.\" The official neuropsychological evaluation is also attached and will be scanned for comparison in the future.  "

## 2021-09-19 ENCOUNTER — PATIENT MESSAGE (OUTPATIENT)
Dept: MEDICAL GROUP | Facility: MEDICAL CENTER | Age: 71
End: 2021-09-19

## 2021-09-19 DIAGNOSIS — I10 ESSENTIAL HYPERTENSION: ICD-10-CM

## 2021-09-20 RX ORDER — AMLODIPINE BESYLATE 5 MG/1
TABLET ORAL
Qty: 90 TABLET | Refills: 0 | Status: SHIPPED | OUTPATIENT
Start: 2021-09-20 | End: 2021-10-14 | Stop reason: SDUPTHER

## 2021-09-20 NOTE — PATIENT COMMUNICATION
Received request via: Patient    Was the patient seen in the last year in this department? Yes    Does the patient have an active prescription (recently filled or refills available) for medication(s) requested? No    Requested Prescriptions     Pending Prescriptions Disp Refills   • amLODIPine (NORVASC) 5 MG Tab 90 Tablet 3     Sig: TAKE 1 TABLET DAILY

## 2021-09-20 NOTE — PROGRESS NOTES
Patient last seen on 10/29/2020 by Dr. Ruelas.  I filled the prescription for 90 days.  Will need to be seen before next refill.  Notify patient regarding this.

## 2021-09-20 NOTE — TELEPHONE ENCOUNTER
From: Ger Dover  To: Physician Nathalie Ruelas  Sent: 9/19/2021 8:10 AM PDT  Subject: Medication Renewal    My Amlodipine needs renewal. It seems to be working as my numbers this morning were:  Weight-168, Blood sugar-139, Blood pressure-124-82-61.   I was treated with radiation at [facility] for Prostate cancer this June. My PSA went from 5.2 to 2.5. I have another blood test in November and will keep you posted.   Overall I feel great at 71 and am here in [place] thru Oct. 21.   Jeri Low, my wife, tells me you are on vacation. It hope it was a good one.   Thanks,  Isaias

## 2021-10-14 ENCOUNTER — OFFICE VISIT (OUTPATIENT)
Dept: MEDICAL GROUP | Facility: MEDICAL CENTER | Age: 71
End: 2021-10-14
Payer: MEDICARE

## 2021-10-14 VITALS
OXYGEN SATURATION: 95 % | BODY MASS INDEX: 28.03 KG/M2 | HEIGHT: 66 IN | WEIGHT: 174.4 LBS | SYSTOLIC BLOOD PRESSURE: 122 MMHG | HEART RATE: 69 BPM | TEMPERATURE: 98 F | DIASTOLIC BLOOD PRESSURE: 60 MMHG

## 2021-10-14 DIAGNOSIS — G31.84 MILD COGNITIVE IMPAIRMENT: ICD-10-CM

## 2021-10-14 DIAGNOSIS — R23.9 SKIN CHANGE: ICD-10-CM

## 2021-10-14 DIAGNOSIS — E78.5 DYSLIPIDEMIA: ICD-10-CM

## 2021-10-14 DIAGNOSIS — E11.8 TYPE 2 DIABETES MELLITUS WITH COMPLICATION, WITHOUT LONG-TERM CURRENT USE OF INSULIN (HCC): ICD-10-CM

## 2021-10-14 DIAGNOSIS — C61 PROSTATE CANCER (HCC): ICD-10-CM

## 2021-10-14 DIAGNOSIS — I10 ESSENTIAL HYPERTENSION: ICD-10-CM

## 2021-10-14 DIAGNOSIS — Z23 NEED FOR VACCINATION: ICD-10-CM

## 2021-10-14 PROCEDURE — 99214 OFFICE O/P EST MOD 30 MIN: CPT | Performed by: INTERNAL MEDICINE

## 2021-10-14 RX ORDER — EMPAGLIFLOZIN 25 MG/1
1 TABLET, FILM COATED ORAL DAILY
Qty: 90 TABLET | Refills: 3 | Status: SHIPPED | OUTPATIENT
Start: 2021-10-14

## 2021-10-14 RX ORDER — LISINOPRIL 40 MG/1
TABLET ORAL
Qty: 90 TABLET | Refills: 4 | Status: SHIPPED | OUTPATIENT
Start: 2021-10-14 | End: 2022-11-07

## 2021-10-14 RX ORDER — HYDROCHLOROTHIAZIDE 12.5 MG/1
CAPSULE, GELATIN COATED ORAL
Qty: 90 CAPSULE | Refills: 4 | Status: SHIPPED | OUTPATIENT
Start: 2021-10-14 | End: 2022-11-07

## 2021-10-14 RX ORDER — AMLODIPINE BESYLATE 5 MG/1
TABLET ORAL
Qty: 90 TABLET | Refills: 4 | Status: SHIPPED | OUTPATIENT
Start: 2021-10-14 | End: 2022-11-07

## 2021-10-14 RX ORDER — ROSUVASTATIN CALCIUM 20 MG/1
TABLET, COATED ORAL
Qty: 90 TABLET | Refills: 4 | Status: SHIPPED | OUTPATIENT
Start: 2021-10-14 | End: 2022-11-07

## 2021-10-14 ASSESSMENT — PATIENT HEALTH QUESTIONNAIRE - PHQ9: CLINICAL INTERPRETATION OF PHQ2 SCORE: 0

## 2021-10-14 ASSESSMENT — FIBROSIS 4 INDEX: FIB4 SCORE: 1.57

## 2021-10-14 NOTE — PROGRESS NOTES
CC:  Diagnoses of Prostate cancer (HCC), Dyslipidemia, Essential hypertension, Type 2 diabetes mellitus with complication, without long-term current use of insulin (HCC), Need for vaccination, and Skin change were pertinent to this visit.    HISTORY OF THE PRESENT ILLNESS: Patient is a 71 y.o. male. This pleasant patient is here today for follow-up, his dual care in [place].    Since last visit got diagnosed prostate cancer January 2021s/p radiation treatments x5 at [facility], he will follow up with his team November 9 for repeat labs and consideration of imaging.  He did have special procedure to protect against radiation-induced proctitis, this worked really well.  He says he has not required any chemotherapy.    Diabetes has been controlled labs 8/20/2021 A1c 6.6, cholesterol controlled LDL 80, microalbuminuria 17, normal CBC/BMP.  Denies any foot concerns other than dry skin and his dermatologist [place] did prescribe him lotion for this he thinks is probably urea.  No cardiopulmonary or strokelike symptoms.  We did discuss baby aspirin, with his diabetes and significant risk factors for cardiovascular disease I still think he would benefit from this since he has not had any history of easy bleeding/bruising or intolerance to aspirin.  Blood pressure remains well controlled.    Notes from [place], particularly neurology 6/23/21 indicate mild cognitive impairment but no evidence of progressive primary neurodegenerative disease and they would follow-up with him in 1 year.    Allergies: Patient has no known allergies.    Current Outpatient Medications Ordered in Epic   Medication Sig Dispense Refill   • amLODIPine (NORVASC) 5 MG Tab TAKE 1 TABLET DAILY 90 Tablet 4   • rosuvastatin (CRESTOR) 20 MG Tab TAKE 1 TABLET EVERY EVENING 90 Tablet 4   • metformin (GLUCOPHAGE) 1000 MG tablet Take 1 Tablet by mouth 2 times a day with meals. 180 Tablet 3   • lisinopril (PRINIVIL) 40 MG tablet TAKE 1 TABLET  "DAILY 90 Tablet 4   • hydrochlorothiazide (MICROZIDE) 12.5 MG capsule TAKE 1 CAPSULE EVERY MORNING 90 Capsule 4   • Empagliflozin (JARDIANCE) 25 MG Tab Take 1 Tablet by mouth every day. 90 Tablet 3   • VITAMIN D, CHOLECALCIFEROL, PO Take 500 mcg by mouth every day.     • Blood Glucose Meter Kit Test blood sugar as recommended by provider. One Touch Ultra blood glucose monitoring kit. 1 Kit 0   • Blood Glucose Test Strips Use one One Touch Ultra strip to test blood sugar once daily . 100 Strip 0   • Lancets Use one One Touch Ultra lancet to test blood sugar once daily . 100 Each 0   • aspirin 81 MG tablet Take 81 mg by mouth every day.     • Multiple Vitamins-Minerals (CENTRUM SILVER 50+MEN) Tab Take  by mouth.       No current Epic-ordered facility-administered medications on file.       Past Medical History:   Diagnosis Date   • Diabetes (HCC)    • Hyperlipidemia    • Hypertension        Past Surgical History:   Procedure Laterality Date   • ARTHROPLASTY Right     right high    • HERNIA REPAIR      bilateral inguinal, 4 total starting at age 7 thru age 46   • OTHER      sx for pyloric stenosis at 7wk of age       Social History     Tobacco Use   • Smoking status: Former Smoker     Packs/day: 0.00     Types: Cigars   • Smokeless tobacco: Never Used   Vaping Use   • Vaping Use: Never used   Substance Use Topics   • Alcohol use: Yes     Alcohol/week: 4.2 - 8.4 oz     Types: 7 - 14 Glasses of wine per week   • Drug use: No       Social History     Social History Narrative   • Not on file       Family History   Problem Relation Age of Onset   • Hypertension Mother    • Hyperlipidemia Mother    • Heart Disease Father    • Hypertension Father    • Cancer Paternal Grandfather         oral cancer/tobacco       Exam: /60 (BP Location: Left arm, Patient Position: Sitting, BP Cuff Size: Adult)   Pulse 69   Temp 36.7 °C (98 °F) (Temporal)   Ht 1.676 m (5' 6\")   Wt 79.1 kg (174 lb 6.4 oz)   SpO2 95%  Body mass index " is 28.15 kg/m².    General: Normal appearing. No distress.  EYES: Conjunctiva clear lids without ptosis, pupils equal  EARS: Normal shape and contour   Pulmonary: Clear to ausculation.  Normal effort. No rales or wheezing.  Cardiovascular: Regular rate and rhythm without significant murmur.   Abdomen: Soft, nontender, nondistended. Normal bowel sounds.  Neurologic: Cranial nerves grossly nonfocal; monofilament testing over feet intact, pulses intact feet, no skin breakdown, there was toenail trauma on left first toe healing nail growing out bruise  Skin: Warm and dry.  No obvious lesions.  Musculoskeletal: Normal gait. No extremity cyanosis, clubbing, or edema.  Psych: Normal mood and affect. Alert and oriented x3. Judgment and insight is normal.      Assessment/Plan  1. Prostate cancer (HCC)  S/p radiation, getting treatment at [facility], doing well.    2. Dyslipidemia  Well-controlled continue Crestor.  - rosuvastatin (CRESTOR) 20 MG Tab; TAKE 1 TABLET EVERY EVENING  Dispense: 90 Tablet; Refill: 4    3. Essential hypertension  Controlled continue current medication management.  - amLODIPine (NORVASC) 5 MG Tab; TAKE 1 TABLET DAILY  Dispense: 90 Tablet; Refill: 4  - lisinopril (PRINIVIL) 40 MG tablet; TAKE 1 TABLET DAILY  Dispense: 90 Tablet; Refill: 4  - hydrochlorothiazide (MICROZIDE) 12.5 MG capsule; TAKE 1 CAPSULE EVERY MORNING  Dispense: 90 Capsule; Refill: 4  - MICROALBUMIN CREAT RATIO URINE; Future  - Comp Metabolic Panel; Future    4. Type 2 diabetes mellitus with complication, without long-term current use of insulin ([facility])  Controlled, continue current medication management.  - metformin (GLUCOPHAGE) 1000 MG tablet; Take 1 Tablet by mouth 2 times a day with meals.  Dispense: 180 Tablet; Refill: 3  - Empagliflozin (JARDIANCE) 25 MG Tab; Take 1 Tablet by mouth every day.  Dispense: 90 Tablet; Refill: 3  - HEMOGLOBIN A1C; Future  - MICROALBUMIN CREAT RATIO URINE; Future  - Comp Metabolic Panel;  Future    5. Need for vaccination  Patient will obtain his flu and pneumonia vaccine at [facility]    6. Skin change  Small skin lesion like a scab near left upper neck, persistent, he wants to see local dermatologist.  Overall this looks like it is probably benign to me.  - REFERRAL TO DERMATOLOGY    RTC 6-month          Please note that this dictation was created using voice recognition software. I have made every reasonable attempt to correct obvious errors, but I expect that there are errors of grammar and possibly content that I did not discover before finalizing the note.

## 2022-03-02 ENCOUNTER — OFFICE VISIT (OUTPATIENT)
Dept: MEDICAL GROUP | Facility: MEDICAL CENTER | Age: 72
End: 2022-03-02
Payer: MEDICARE

## 2022-03-02 VITALS
BODY MASS INDEX: 28.45 KG/M2 | HEIGHT: 66 IN | SYSTOLIC BLOOD PRESSURE: 106 MMHG | WEIGHT: 177 LBS | DIASTOLIC BLOOD PRESSURE: 60 MMHG | TEMPERATURE: 97.9 F | OXYGEN SATURATION: 95 % | RESPIRATION RATE: 16 BRPM | HEART RATE: 75 BPM

## 2022-03-02 DIAGNOSIS — E78.5 DYSLIPIDEMIA: ICD-10-CM

## 2022-03-02 DIAGNOSIS — E11.8 TYPE 2 DIABETES MELLITUS WITH COMPLICATION, WITHOUT LONG-TERM CURRENT USE OF INSULIN (HCC): ICD-10-CM

## 2022-03-02 DIAGNOSIS — E13.319 RETINOPATHY DUE TO SECONDARY DM (HCC): ICD-10-CM

## 2022-03-02 DIAGNOSIS — C61 PROSTATE CANCER (HCC): ICD-10-CM

## 2022-03-02 DIAGNOSIS — I10 ESSENTIAL HYPERTENSION: ICD-10-CM

## 2022-03-02 DIAGNOSIS — H91.8X3 OTHER SPECIFIED HEARING LOSS OF BOTH EARS: ICD-10-CM

## 2022-03-02 LAB
HBA1C MFR BLD: 6.8 % (ref 0–5.6)
INT CON NEG: NEGATIVE
INT CON POS: POSITIVE

## 2022-03-02 PROCEDURE — 99214 OFFICE O/P EST MOD 30 MIN: CPT | Performed by: FAMILY MEDICINE

## 2022-03-02 PROCEDURE — 83036 HEMOGLOBIN GLYCOSYLATED A1C: CPT | Performed by: FAMILY MEDICINE

## 2022-03-02 ASSESSMENT — PATIENT HEALTH QUESTIONNAIRE - PHQ9: CLINICAL INTERPRETATION OF PHQ2 SCORE: 0

## 2022-03-02 NOTE — LETTER
Request for Medical Records    Patient Name: Ger Dover    : 1950      Dear Doctor: Agus    The above named patient receives primary care at the [facility] by Carie Queen M.D..  The patient informs us that you are his eye care Provider.    Please fax a copy of the most recent eye exam to [phone] or answer the  questions below and fax this sheet back to us at the above number.  Attached is a signed Release of Information.      Date of last eye exam: _____________    Retinal eye exam summary:        Please select the choice(s) that apply.    ____ No diabetic retinopathy    ____    Diabetic retinopathy present      Printed Name and Credentials: __________________________________    Signature of Eye Care Provider: _________________________________    We appreciate your assistance and collaboration in providing efficient patient care!    Kindest Regards,    [facility] [address] [facility]  [place] [address] [phone]

## 2022-03-03 NOTE — PROGRESS NOTES
CC: New patient: Diabetes, hypertension, hyperlipidemia, history of prostate cancer, hearing loss    HPI:  Ger presents today establishing PCP.    Patient has been active and independent with all ADLs.  The following chronic medical issues:    Type 2 diabetes mellitus with complication, without long-term current use of insulin ([facility])/ Retinopathy due to secondary DM (HCC)  Blood glucose has been adequately controlled.  A1c today 6.8.  Patient has been asymptomatic.  He is currently on Metformin 1000 mg twice a day, and Jardiance 25 mg daily.  No side effects    Essential hypertension  Patient's blood pressure has been adequately controlled on amlodipine 5 mg daily, lisinopril 40 mg daily, and hydrochlorothiazide 12.5 mg daily.  No side effects    Dyslipidemia  He has been tolerating the statin. Denies muscle pain LFTs has been normal, he is currently on rosuvastatin 20 mg daily    Prostate cancer (HCC)  Patient underwent CyberKnife treatment in [place].  Currently in remission.  Denies any blood in the urine or urinary retention.  As per patient PSA went down to normal.  He declined referral to urology in [place] because he already seeing in [place].    Other specified hearing loss of both ears  Has been using hearing aids with no issues.      Patient Active Problem List    Diagnosis Date Noted   • Mild cognitive impairment 10/14/2021   • Prostate cancer (HCC) 01/22/2021   • Retinopathy due to secondary DM ([facility]) 01/14/2020   • Essential hypertension 07/03/2019   • Type 2 diabetes mellitus with complication, without long-term current use of insulin ([facility]) 07/03/2019   • Preventative health care 07/03/2019   • Dyslipidemia 07/03/2019   • History of total right hip replacement 07/03/2019   • Bilateral hearing loss 07/03/2019   • History of pyloric stenosis as a child 07/03/2019       Current Outpatient Medications   Medication Sig Dispense Refill   • VITAMIN D, CHOLECALCIFEROL, PO Take 500 mcg by mouth every day.     •  amLODIPine (NORVASC) 5 MG Tab TAKE 1 TABLET DAILY 90 Tablet 4   • rosuvastatin (CRESTOR) 20 MG Tab TAKE 1 TABLET EVERY EVENING 90 Tablet 4   • metformin (GLUCOPHAGE) 1000 MG tablet Take 1 Tablet by mouth 2 times a day with meals. 180 Tablet 3   • lisinopril (PRINIVIL) 40 MG tablet TAKE 1 TABLET DAILY 90 Tablet 4   • hydrochlorothiazide (MICROZIDE) 12.5 MG capsule TAKE 1 CAPSULE EVERY MORNING 90 Capsule 4   • Empagliflozin (JARDIANCE) 25 MG Tab Take 1 Tablet by mouth every day. 90 Tablet 3   • Blood Glucose Meter Kit Test blood sugar as recommended by provider. One Touch Ultra blood glucose monitoring kit. 1 Kit 0   • Blood Glucose Test Strips Use one One Touch Ultra strip to test blood sugar once daily . 100 Strip 0   • Lancets Use one One Touch Ultra lancet to test blood sugar once daily . 100 Each 0   • aspirin 81 MG tablet Take 81 mg by mouth every day.     • Multiple Vitamins-Minerals (CENTRUM SILVER 50+MEN) Tab Take  by mouth.       No current facility-administered medications for this visit.         Allergies as of 03/02/2022   • (No Known Allergies)        Social History     Socioeconomic History   • Marital status:      Spouse name: Not on file   • Number of children: Not on file   • Years of education: Not on file   • Highest education level: Not on file   Occupational History   • Not on file   Tobacco Use   • Smoking status: Former Smoker     Packs/day: 0.00     Types: Cigars   • Smokeless tobacco: Never Used   Vaping Use   • Vaping Use: Never used   Substance and Sexual Activity   • Alcohol use: Yes     Alcohol/week: 4.2 - 8.4 oz     Types: 7 - 14 Glasses of wine per week   • Drug use: No   • Sexual activity: Not on file   Other Topics Concern   • Not on file   Social History Narrative   • Not on file     Social Determinants of Health     Financial Resource Strain: Not on file   Food Insecurity: Not on file   Transportation Needs: Not on file   Physical Activity: Not on file   Stress: Not on file  "  Social Connections: Not on file   Intimate Partner Violence: Not on file   Housing Stability: Not on file       Family History   Problem Relation Age of Onset   • Hypertension Mother    • Hyperlipidemia Mother    • Heart Disease Father    • Hypertension Father    • Cancer Paternal Grandfather         oral cancer/tobacco       Past Surgical History:   Procedure Laterality Date   • ARTHROPLASTY Right     right high    • HERNIA REPAIR      bilateral inguinal, 4 total starting at age 7 thru age 46   • OTHER      sx for pyloric stenosis at 6wk of age       ROS:  Denies any Headache, Blurred Vision, Confusion Chest pain,  Shortness of breath,  Abdominal pain, Changes of bowel or bladder, Lower ext edema, Fevers, Nights sweats, Weight Changes, Focal weakness or numbness.  All other systems are negative.    /60 (BP Location: Right arm, Patient Position: Sitting, BP Cuff Size: Adult)   Pulse 75   Temp 36.6 °C (97.9 °F) (Temporal)   Resp 16   Ht 1.676 m (5' 6\")   Wt 80.3 kg (177 lb)   SpO2 95%   BMI 28.57 kg/m²     Physical Exam:  Gen:         Alert and oriented, No apparent distress.  HEENT:   [place], TM clear,  Oralpharynx no erythema or exudates.  Neck:       No Jugular venous distension, Lymphadenopathy, Thyromegaly, Bruits.  Lungs:     Clear to auscultation bilaterally  CV:          Regular rate and rhythm. No murmurs, rubs or gallops.  Abd:         Soft non tender, non distended. Normal active bowel sounds. No                                        Hepatosplenomegaly, No pulsatile masses.  Ext:          No clubbing, cyanosis, edema.      Assessment and Plan.   71 y.o. male     1. Type 2 diabetes mellitus with complication, without long-term current use of insulin (HCC)/ Retinopathy due to secondary DM (HCC)  Controlled.  A1c today 6.8  Continue Metformin 1000 mg twice a day, and Jardiance 25 mg daily    - POCT Hemoglobin A1C    2. Essential hypertension  Controlled.  Continue on amlodipine 5 mg daily, " lisinopril 40 mg daily, and hydrochlorothiazide 12.5 mg daily    3. Dyslipidemia  He has been tolerating the statin. Denies muscle pain LFTs has been normal  Continue on rosuvastatin 20 mg daily    4. Prostate cancer (HCC)  Status post CyberKnife treatment in [place].  Currently in remission.  As per patient PSA went down to normal.  Continue follow-up with urology.  Patient declined referral to urology in [place] because he already seeing in [place].    6. Other specified hearing loss of both ears  Has been using hearing aids with no issues.

## 2022-04-14 ENCOUNTER — APPOINTMENT (OUTPATIENT)
Dept: MEDICAL GROUP | Facility: MEDICAL CENTER | Age: 72
End: 2022-04-14
Payer: COMMERCIAL

## 2022-05-24 DIAGNOSIS — E11.8 TYPE 2 DIABETES MELLITUS WITH COMPLICATION, WITHOUT LONG-TERM CURRENT USE OF INSULIN (HCC): ICD-10-CM

## 2022-11-04 ENCOUNTER — PATIENT MESSAGE (OUTPATIENT)
Dept: HEALTH INFORMATION MANAGEMENT | Facility: OTHER | Age: 72
End: 2022-11-04

## 2022-11-06 DIAGNOSIS — I10 ESSENTIAL HYPERTENSION: ICD-10-CM

## 2022-11-06 DIAGNOSIS — E78.5 DYSLIPIDEMIA: ICD-10-CM

## 2022-11-07 RX ORDER — HYDROCHLOROTHIAZIDE 12.5 MG/1
CAPSULE, GELATIN COATED ORAL
Qty: 90 CAPSULE | Refills: 3 | Status: SHIPPED | OUTPATIENT
Start: 2022-11-07 | End: 2023-07-07

## 2022-11-07 RX ORDER — LISINOPRIL 40 MG/1
TABLET ORAL
Qty: 90 TABLET | Refills: 3 | Status: SHIPPED | OUTPATIENT
Start: 2022-11-07 | End: 2023-09-13

## 2022-11-07 RX ORDER — ROSUVASTATIN CALCIUM 20 MG/1
TABLET, COATED ORAL
Qty: 90 TABLET | Refills: 3 | Status: SHIPPED | OUTPATIENT
Start: 2022-11-07 | End: 2023-09-13

## 2022-11-07 RX ORDER — AMLODIPINE BESYLATE 5 MG/1
TABLET ORAL
Qty: 90 TABLET | Refills: 3 | Status: SHIPPED | OUTPATIENT
Start: 2022-11-07 | End: 2024-02-02

## 2023-01-05 ENCOUNTER — APPOINTMENT (OUTPATIENT)
Dept: MEDICAL GROUP | Facility: MEDICAL CENTER | Age: 73
End: 2023-01-05
Payer: MEDICARE

## 2023-02-16 ENCOUNTER — OFFICE VISIT (OUTPATIENT)
Dept: MEDICAL GROUP | Facility: MEDICAL CENTER | Age: 73
End: 2023-02-16
Payer: MEDICARE

## 2023-02-16 VITALS
SYSTOLIC BLOOD PRESSURE: 100 MMHG | TEMPERATURE: 97.6 F | WEIGHT: 174 LBS | OXYGEN SATURATION: 95 % | HEIGHT: 66 IN | BODY MASS INDEX: 27.97 KG/M2 | HEART RATE: 75 BPM | RESPIRATION RATE: 16 BRPM | DIASTOLIC BLOOD PRESSURE: 60 MMHG

## 2023-02-16 DIAGNOSIS — E11.69 MIXED HYPERLIPIDEMIA DUE TO TYPE 2 DIABETES MELLITUS (HCC): ICD-10-CM

## 2023-02-16 DIAGNOSIS — I10 ESSENTIAL HYPERTENSION: ICD-10-CM

## 2023-02-16 DIAGNOSIS — M15.9 OSTEOARTHRITIS OF MULTIPLE JOINTS, UNSPECIFIED OSTEOARTHRITIS TYPE: ICD-10-CM

## 2023-02-16 DIAGNOSIS — E78.2 MIXED HYPERLIPIDEMIA DUE TO TYPE 2 DIABETES MELLITUS (HCC): ICD-10-CM

## 2023-02-16 DIAGNOSIS — E11.8 TYPE 2 DIABETES MELLITUS WITH COMPLICATION, WITHOUT LONG-TERM CURRENT USE OF INSULIN (HCC): ICD-10-CM

## 2023-02-16 LAB
HBA1C MFR BLD: 7.2 % (ref ?–5.8)
POCT INT CON NEG: NEGATIVE
POCT INT CON POS: POSITIVE

## 2023-02-16 PROCEDURE — 83036 HEMOGLOBIN GLYCOSYLATED A1C: CPT | Performed by: FAMILY MEDICINE

## 2023-02-16 PROCEDURE — 99214 OFFICE O/P EST MOD 30 MIN: CPT | Performed by: FAMILY MEDICINE

## 2023-02-16 RX ORDER — FLUOROURACIL 50 MG/G
CREAM TOPICAL
COMMUNITY
Start: 2022-12-27

## 2023-02-16 RX ORDER — AMOXICILLIN 500 MG/1
CAPSULE ORAL
COMMUNITY
Start: 2022-12-05 | End: 2024-02-02

## 2023-02-16 ASSESSMENT — PATIENT HEALTH QUESTIONNAIRE - PHQ9: CLINICAL INTERPRETATION OF PHQ2 SCORE: 0

## 2023-02-16 NOTE — PROGRESS NOTES
CC: Diabetes type 2, hypertension, hyperlipidemia, multiple joint pain asked for DMV placard    HPI:   Ger presents today to discuss the following:    Type 2 diabetes mellitus with complication, without long-term current use of insulin ([facility])  Blood glucose has been adequately controlled.  A1c today, it was 6.8 in 9/2023.  Patient has been asymptomatic.  He is currently on Metformin 1000 mg twice a day, and Jardiance 25 mg daily.  No side effects.  Patient is due for monofilament foot exam today.  Due for microalbuminuria screening and retinal exam, however patient has an eye exam tomorrow     Essential hypertension  Patient's blood pressure has been adequately controlled on amlodipine 5 mg daily, lisinopril 40 mg daily, and hydrochlorothiazide 12.5 mg daily.  No side effects     Dyslipidemia  He has been tolerating the statin. Denies muscle pain LFTs has been normal, he is currently on rosuvastatin 20 mg daily    Osteoarthritis of multiple joints  Patient with history of multiple joint pain, underwent right hip replacement, now however the left hip pain gets worse he will follow-up with his orthopedist for more evaluation and possibly hip replacement.  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 ([facility]) 01/14/2020    Essential hypertension 07/03/2019    Type 2 diabetes mellitus with complication, without long-term current use of insulin ([facility]) 07/03/2019    Preventative health care 07/03/2019    Dyslipidemia 07/03/2019    History of total right hip replacement 07/03/2019    Bilateral hearing loss 07/03/2019    History of pyloric stenosis as a child 07/03/2019       Current Outpatient Medications   Medication Sig Dispense Refill    metformin (GLUCOPHAGE) 1000 MG tablet TAKE 1 TABLET BY MOUTH  TWICE A DAY WITH  "MEALS 180 Tablet 3    hydrochlorothiazide (MICROZIDE) 12.5 MG capsule TAKE 1 CAPSULE BY MOUTH  EVERY MORNING 90 Capsule 3    lisinopril (PRINIVIL) 40 MG tablet TAKE 1 TABLET BY MOUTH  DAILY 90 Tablet 3    rosuvastatin (CRESTOR) 20 MG Tab TAKE 1 TABLET BY MOUTH  EVERY EVENING 90 Tablet 3    amLODIPine (NORVASC) 5 MG Tab TAKE 1 TABLET BY MOUTH  DAILY 90 Tablet 3    VITAMIN D, CHOLECALCIFEROL, PO Take 500 mcg by mouth every day.      Empagliflozin (JARDIANCE) 25 MG Tab Take 1 Tablet by mouth every day. 90 Tablet 3    Blood Glucose Meter Kit Test blood sugar as recommended by provider. One Touch Ultra blood glucose monitoring kit. 1 Kit 0    Blood Glucose Test Strips Use one One Touch Ultra strip to test blood sugar once daily . 100 Strip 0    Lancets Use one One Touch Ultra lancet to test blood sugar once daily . 100 Each 0    aspirin 81 MG tablet Take 81 mg by mouth every day.      Multiple Vitamins-Minerals (CENTRUM SILVER 50+MEN) Tab Take  by mouth.       No current facility-administered medications for this visit.         Allergies as of 02/16/2023    (No Known Allergies)        ROS: Denies any chest pain, Shortness of breath, Changes bowel or bladder, Lower extremity edema.    Physical Exam:  /60 (BP Location: Right arm, Patient Position: Sitting, BP Cuff Size: Adult)   Pulse 75   Temp 36.4 °C (97.6 °F) (Temporal)   Resp 16   Ht 1.676 m (5' 6\")   Wt 78.9 kg (174 lb)   SpO2 95%   BMI 28.08 kg/m²   Gen.: Well-developed, well-nourished, no apparent distress,pleasant and cooperative with the examination  Skin:  Warm and dry with good turgor. No rashes or suspicious lesions in visible areas  HEENT:Sinuses nontender with palpation, TMs clear, nares patent with pink mucosa and clear rhinorrhea,no septal deviation ,polyps or lesions. lips without lesions, oropharynx clear.  Neck: Trachea midline,no masses or adenopathy. No JVD.  Cor: Regular rate and rhythm without murmur, gallop or rub.  Lungs: Respirations " unlabored.Clear to auscultation with equal breath sounds bilaterally. No wheezes, rhonchi.  Extremities: No cyanosis, clubbing or edema.    Monofilament foot exam: Normal sensation bilaterally, no macerations or ulcers, normal peripheral pulses.      Assessment and Plan.   72 y.o. male     1. Type 2 diabetes mellitus with complication, without long-term current use of insulin ([facility])  A1c today is 7.2, t was 6.8 last visit.  Continue on Metformin 1000 mg twice a day, and Jardiance 25 mg daily.    Monofilament foot exam showed no sign of neuropathy    - POCT  A1C  -Diabetic monofilament LE exam  - Microalbumin creatinine ratio    2. Mixed hyperlipidemia due to type 2 diabetes mellitus (HCC)  He has been tolerating the statin. Denies muscle pain LFTs has been normal  Continue on rosuvastatin 20 mg daily    3. Essential hypertension  Controlled  Continue on amlodipine 5 mg daily, lisinopril 40 mg daily, and hydrochlorothiazide 12.5 mg daily.     4.  Osteoarthritis of multiple joints  Patient with history of multiple joint pain, underwent right hip replacement, now however the left hip pain gets worse he will follow-up with his orthopedist for more evaluation and possibly hip replacement.  Patient has been having problems walking long distances, he asked for DMV placard form

## 2023-04-20 DIAGNOSIS — E11.8 TYPE 2 DIABETES MELLITUS WITH COMPLICATION, WITHOUT LONG-TERM CURRENT USE OF INSULIN (HCC): ICD-10-CM

## 2023-07-07 ENCOUNTER — OFFICE VISIT (OUTPATIENT)
Dept: MEDICAL GROUP | Facility: MEDICAL CENTER | Age: 73
End: 2023-07-07
Payer: MEDICARE

## 2023-07-07 VITALS
BODY MASS INDEX: 27.25 KG/M2 | HEIGHT: 66 IN | OXYGEN SATURATION: 96 % | TEMPERATURE: 98.1 F | SYSTOLIC BLOOD PRESSURE: 130 MMHG | HEART RATE: 77 BPM | DIASTOLIC BLOOD PRESSURE: 60 MMHG | WEIGHT: 169.53 LBS | RESPIRATION RATE: 16 BRPM

## 2023-07-07 DIAGNOSIS — I10 ESSENTIAL HYPERTENSION: ICD-10-CM

## 2023-07-07 DIAGNOSIS — E11.8 TYPE 2 DIABETES MELLITUS WITH COMPLICATION, WITHOUT LONG-TERM CURRENT USE OF INSULIN (HCC): ICD-10-CM

## 2023-07-07 DIAGNOSIS — E11.69 MIXED HYPERLIPIDEMIA DUE TO TYPE 2 DIABETES MELLITUS (HCC): ICD-10-CM

## 2023-07-07 DIAGNOSIS — E78.2 MIXED HYPERLIPIDEMIA DUE TO TYPE 2 DIABETES MELLITUS (HCC): ICD-10-CM

## 2023-07-07 LAB
HBA1C MFR BLD: 6.5 % (ref ?–5.8)
POCT INT CON NEG: NEGATIVE
POCT INT CON POS: POSITIVE

## 2023-07-07 PROCEDURE — 3078F DIAST BP <80 MM HG: CPT | Performed by: FAMILY MEDICINE

## 2023-07-07 PROCEDURE — 83036 HEMOGLOBIN GLYCOSYLATED A1C: CPT | Performed by: FAMILY MEDICINE

## 2023-07-07 PROCEDURE — 99214 OFFICE O/P EST MOD 30 MIN: CPT | Performed by: FAMILY MEDICINE

## 2023-07-07 PROCEDURE — 3075F SYST BP GE 130 - 139MM HG: CPT | Performed by: FAMILY MEDICINE

## 2023-07-07 RX ORDER — AMLODIPINE BESYLATE 10 MG/1
10 TABLET ORAL DAILY
Qty: 90 TABLET | Refills: 2 | Status: SHIPPED | OUTPATIENT
Start: 2023-07-07 | End: 2024-02-02

## 2023-07-07 RX ORDER — AMLODIPINE BESYLATE 5 MG/1
1 TABLET ORAL
COMMUNITY
End: 2023-07-07

## 2023-07-07 RX ORDER — EMPAGLIFLOZIN 10 MG/1
TABLET, FILM COATED ORAL
COMMUNITY
End: 2024-02-02

## 2023-07-07 NOTE — PROGRESS NOTES
CC: Diabetes, hypertension, hyperlipidemia    HPI:   Ger presents today to discuss the following    Type 2 diabetes mellitus with complication, without long-term current use of insulin ([facility])  Blood glucose has been adequately controlled.  Patient's A1c today is 6.5.  Denies polyuria and polydipsia.  He is currently on Metformin 1000 mg twice a day, and Jardiance 25 mg daily.  No side effects.      Essential hypertension  Blood pressure has been adequately controlled on amlodipine 5 mg daily, lisinopril 40 mg daily, and hydrochlorothiazide 12.5 mg daily.  No side effects     Dyslipidemia  He has been tolerating the statin. Denies muscle pain LFTs has been normal, he is currently on rosuvastatin 20 mg daily    Patient Active Problem List    Diagnosis Date Noted    Mild cognitive impairment 10/14/2021    Prostate cancer (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) 01/14/2020    Essential hypertension 07/03/2019    Type 2 diabetes mellitus with complication, without long-term current use of insulin ([facility]) 07/03/2019    Preventative health care 07/03/2019    Dyslipidemia 07/03/2019    History of total right hip replacement 07/03/2019    Bilateral hearing loss 07/03/2019    History of pyloric stenosis as a child 07/03/2019       Current Outpatient Medications   Medication Sig Dispense Refill    amLODIPine (NORVASC) 10 MG Tab Take 1 Tablet by mouth every day. 90 Tablet 2    econazole nitrate 1 % cream APPLY TO AFFECTED AREA OF GROIN TWICE DAILY FOR 2 WEEKS      Empagliflozin (JARDIANCE) 10 MG Tab tablet       metformin (GLUCOPHAGE) 1000 MG tablet TAKE 1 TABLET BY MOUTH  TWICE DAILY WITH MEALS 180 Tablet 3    amoxicillin (AMOXIL) 500 MG Cap TAKE 4 CAPSULES BY MOUTH 1 HOUR BEFORE APPT      fluorouracil (EFUDEX) 5 % cream APPLY TO AFFECTED AREA ON SCALP AT BEDTIME FOR 3 WEEKS, LESS FREQUENT FOR SEVERE IRRITATION      lisinopril (PRINIVIL) 40 MG tablet TAKE 1 TABLET BY MOUTH  DAILY 90 Tablet 3    rosuvastatin  "(CRESTOR) 20 MG Tab TAKE 1 TABLET BY MOUTH  EVERY EVENING 90 Tablet 3    amLODIPine (NORVASC) 5 MG Tab TAKE 1 TABLET BY MOUTH  DAILY 90 Tablet 3    VITAMIN D, CHOLECALCIFEROL, PO Take 500 mcg by mouth every day.      Empagliflozin (JARDIANCE) 25 MG Tab Take 1 Tablet by mouth every day. 90 Tablet 3    Blood Glucose Meter Kit Test blood sugar as recommended by provider. One Touch Ultra blood glucose monitoring kit. 1 Kit 0    Blood Glucose Test Strips Use one One Touch Ultra strip to test blood sugar once daily . 100 Strip 0    Lancets Use one One Touch Ultra lancet to test blood sugar once daily . 100 Each 0    aspirin 81 MG tablet Take 81 mg by mouth every day.      Multiple Vitamins-Minerals (CENTRUM SILVER 50+MEN) Tab Take  by mouth.       No current facility-administered medications for this visit.         Allergies as of 07/07/2023    (No Known Allergies)        ROS: Denies any chest pain, Shortness of breath, Changes bowel or bladder, Lower extremity edema.    Physical Exam:  /60 (BP Location: Right arm, Patient Position: Sitting, BP Cuff Size: Adult)   Pulse 77   Temp 36.7 °C (98.1 °F) (Temporal)   Resp 16   Ht 1.676 m (5' 6\")   Wt 76.9 kg (169 lb 8.5 oz)   SpO2 96%   BMI 27.36 kg/m²   Gen.: Well-developed, well-nourished, no apparent distress,pleasant and cooperative with the examination  Skin:  Warm and dry with good turgor. No rashes or suspicious lesions in visible areas  HEENT:Sinuses nontender with palpation, TMs clear, nares patent with pink mucosa and clear rhinorrhea,no septal deviation ,polyps or lesions. lips without lesions, oropharynx clear.  Neck: Trachea midline,no masses or adenopathy. No JVD.  Cor: Regular rate and rhythm without murmur, gallop or rub.  Lungs: Respirations unlabored.Clear to auscultation with equal breath sounds bilaterally. No wheezes, rhonchi.  Extremities: No cyanosis, clubbing or edema.      Assessment and Plan.   73 y.o. male     1. Type 2 diabetes mellitus " with complication, without long-term current use of insulin ([facility])   A1c today is 6.5.    Continue on Metformin 1000 mg twice a day, and Jardiance 25 mg daily.     - POCT Hemoglobin A1C    2. Essential hypertension  Controlled.    Continue on amlodipine 5 mg daily, lisinopril 40 mg daily, and hydrochlorothiazide 12.5 mg daily.    3. Mixed hyperlipidemia due to type 2 diabetes mellitus (HCC)  He has been tolerating the statin. Denies muscle pain LFTs has been normal  Continue rosuvastatin 20 mg daily

## 2023-09-12 DIAGNOSIS — E78.5 DYSLIPIDEMIA: ICD-10-CM

## 2023-09-12 DIAGNOSIS — I10 ESSENTIAL HYPERTENSION: ICD-10-CM

## 2023-09-13 RX ORDER — LISINOPRIL 40 MG/1
TABLET ORAL
Qty: 90 TABLET | Refills: 3 | Status: SHIPPED | OUTPATIENT
Start: 2023-09-13

## 2023-09-13 RX ORDER — ROSUVASTATIN CALCIUM 20 MG/1
TABLET, COATED ORAL
Qty: 90 TABLET | Refills: 3 | Status: SHIPPED | OUTPATIENT
Start: 2023-09-13

## 2024-01-08 ENCOUNTER — APPOINTMENT (OUTPATIENT)
Dept: MEDICAL GROUP | Facility: MEDICAL CENTER | Age: 74
End: 2024-01-08
Payer: MEDICARE

## 2024-02-01 ENCOUNTER — TELEPHONE (OUTPATIENT)
Dept: MEDICAL GROUP | Facility: MEDICAL CENTER | Age: 74
End: 2024-02-01
Payer: MEDICARE

## 2024-02-01 NOTE — TELEPHONE ENCOUNTER
Patient left a voicemail, yesterday he had blood in the urine, but today he had it went back to normal. He wants to know what's the best course of action. Please advise

## 2024-02-02 ENCOUNTER — HOSPITAL ENCOUNTER (OUTPATIENT)
Facility: MEDICAL CENTER | Age: 74
End: 2024-02-02
Attending: STUDENT IN AN ORGANIZED HEALTH CARE EDUCATION/TRAINING PROGRAM
Payer: MEDICARE

## 2024-02-02 ENCOUNTER — HOSPITAL ENCOUNTER (OUTPATIENT)
Dept: LAB | Facility: MEDICAL CENTER | Age: 74
End: 2024-02-02
Attending: STUDENT IN AN ORGANIZED HEALTH CARE EDUCATION/TRAINING PROGRAM
Payer: MEDICARE

## 2024-02-02 ENCOUNTER — OFFICE VISIT (OUTPATIENT)
Dept: MEDICAL GROUP | Facility: MEDICAL CENTER | Age: 74
End: 2024-02-02
Payer: MEDICARE

## 2024-02-02 VITALS
RESPIRATION RATE: 16 BRPM | TEMPERATURE: 98.6 F | HEART RATE: 80 BPM | DIASTOLIC BLOOD PRESSURE: 60 MMHG | OXYGEN SATURATION: 97 % | WEIGHT: 167.11 LBS | SYSTOLIC BLOOD PRESSURE: 110 MMHG | BODY MASS INDEX: 26.86 KG/M2 | HEIGHT: 66 IN

## 2024-02-02 DIAGNOSIS — C61 PROSTATE CANCER (HCC): ICD-10-CM

## 2024-02-02 DIAGNOSIS — R31.0 GROSS HEMATURIA: ICD-10-CM

## 2024-02-02 DIAGNOSIS — E11.8 TYPE 2 DIABETES MELLITUS WITH COMPLICATION, WITHOUT LONG-TERM CURRENT USE OF INSULIN (HCC): ICD-10-CM

## 2024-02-02 DIAGNOSIS — E13.319 RETINOPATHY DUE TO SECONDARY DM (HCC): ICD-10-CM

## 2024-02-02 LAB
ANION GAP SERPL CALC-SCNC: 14 MMOL/L (ref 7–16)
APPEARANCE UR: CLEAR
APPEARANCE UR: NORMAL
BACTERIA #/AREA URNS HPF: NEGATIVE /HPF
BILIRUB UR QL STRIP.AUTO: NEGATIVE
BILIRUB UR STRIP-MCNC: NEGATIVE MG/DL
BUN SERPL-MCNC: 15 MG/DL (ref 8–22)
CALCIUM SERPL-MCNC: 10.1 MG/DL (ref 8.5–10.5)
CHLORIDE SERPL-SCNC: 102 MMOL/L (ref 96–112)
CO2 SERPL-SCNC: 24 MMOL/L (ref 20–33)
COLOR UR AUTO: YELLOW
COLOR UR: YELLOW
CREAT SERPL-MCNC: 0.83 MG/DL (ref 0.5–1.4)
CREAT UR-MCNC: 31.96 MG/DL
EPI CELLS #/AREA URNS HPF: NEGATIVE /HPF
GFR SERPLBLD CREATININE-BSD FMLA CKD-EPI: 93 ML/MIN/1.73 M 2
GLUCOSE SERPL-MCNC: 118 MG/DL (ref 65–99)
GLUCOSE UR STRIP.AUTO-MCNC: >=1000 MG/DL
GLUCOSE UR STRIP.AUTO-MCNC: >=1000 MG/DL
HYALINE CASTS #/AREA URNS LPF: ABNORMAL /LPF
KETONES UR STRIP.AUTO-MCNC: NEGATIVE MG/DL
KETONES UR STRIP.AUTO-MCNC: NEGATIVE MG/DL
LEUKOCYTE ESTERASE UR QL STRIP.AUTO: NEGATIVE
LEUKOCYTE ESTERASE UR QL STRIP.AUTO: NEGATIVE
MICRO URNS: ABNORMAL
MICROALBUMIN UR-MCNC: 1.7 MG/DL
MICROALBUMIN/CREAT UR: 53 MG/G (ref 0–30)
NITRITE UR QL STRIP.AUTO: NEGATIVE
NITRITE UR QL STRIP.AUTO: NEGATIVE
PH UR STRIP.AUTO: 5.5 [PH] (ref 5–8)
PH UR STRIP.AUTO: 6 [PH] (ref 5–8)
POTASSIUM SERPL-SCNC: 4.3 MMOL/L (ref 3.6–5.5)
PROT UR QL STRIP: NEGATIVE MG/DL
PROT UR QL STRIP: NEGATIVE MG/DL
RBC # URNS HPF: >150 /HPF
RBC UR QL AUTO: ABNORMAL
RBC UR QL AUTO: NORMAL
SODIUM SERPL-SCNC: 140 MMOL/L (ref 135–145)
SP GR UR STRIP.AUTO: 1.01
SP GR UR STRIP.AUTO: 1.03
UROBILINOGEN UR STRIP-MCNC: 0.2 MG/DL
UROBILINOGEN UR STRIP.AUTO-MCNC: 0.2 MG/DL
WBC #/AREA URNS HPF: ABNORMAL /HPF

## 2024-02-02 PROCEDURE — 81001 URINALYSIS AUTO W/SCOPE: CPT

## 2024-02-02 PROCEDURE — 99214 OFFICE O/P EST MOD 30 MIN: CPT | Performed by: STUDENT IN AN ORGANIZED HEALTH CARE EDUCATION/TRAINING PROGRAM

## 2024-02-02 PROCEDURE — 3074F SYST BP LT 130 MM HG: CPT | Performed by: STUDENT IN AN ORGANIZED HEALTH CARE EDUCATION/TRAINING PROGRAM

## 2024-02-02 PROCEDURE — 82570 ASSAY OF URINE CREATININE: CPT

## 2024-02-02 PROCEDURE — 3078F DIAST BP <80 MM HG: CPT | Performed by: STUDENT IN AN ORGANIZED HEALTH CARE EDUCATION/TRAINING PROGRAM

## 2024-02-02 PROCEDURE — 81002 URINALYSIS NONAUTO W/O SCOPE: CPT | Performed by: STUDENT IN AN ORGANIZED HEALTH CARE EDUCATION/TRAINING PROGRAM

## 2024-02-02 PROCEDURE — 82043 UR ALBUMIN QUANTITATIVE: CPT

## 2024-02-02 PROCEDURE — 80048 BASIC METABOLIC PNL TOTAL CA: CPT

## 2024-02-02 PROCEDURE — 36415 COLL VENOUS BLD VENIPUNCTURE: CPT

## 2024-02-02 RX ORDER — BENZONATATE 100 MG/1
CAPSULE ORAL
COMMUNITY
Start: 2024-01-03

## 2024-02-02 RX ORDER — BLOOD SUGAR DIAGNOSTIC
STRIP MISCELLANEOUS
COMMUNITY
Start: 2024-01-18

## 2024-02-02 RX ORDER — KETOCONAZOLE 20 MG/G
CREAM TOPICAL
COMMUNITY
End: 2024-02-02

## 2024-02-02 RX ORDER — SULFACETAMIDE SODIUM, SULFUR 100; 50 MG/G; MG/G
EMULSION TOPICAL
COMMUNITY
Start: 2023-08-31

## 2024-02-02 RX ORDER — AMLODIPINE BESYLATE 10 MG/1
10 TABLET ORAL DAILY
Qty: 90 TABLET | Refills: 3 | Status: SHIPPED | OUTPATIENT
Start: 2024-02-02

## 2024-02-02 RX ORDER — AMMONIUM LACTATE 12 G/100G
LOTION TOPICAL
COMMUNITY
Start: 2023-12-01 | End: 2024-02-02

## 2024-02-02 ASSESSMENT — ENCOUNTER SYMPTOMS
FLANK PAIN: 0
PALPITATIONS: 0
VOMITING: 0
CHILLS: 0
WHEEZING: 0
DIZZINESS: 0
WEIGHT LOSS: 0
FEVER: 0
HEADACHES: 0
SHORTNESS OF BREATH: 0
NAUSEA: 0

## 2024-02-02 ASSESSMENT — PATIENT HEALTH QUESTIONNAIRE - PHQ9: CLINICAL INTERPRETATION OF PHQ2 SCORE: 0

## 2024-02-02 NOTE — PROGRESS NOTES
"Subjective:     CC: acute, blood in urine    HPI:   Ger presents today with    Pmh of prostate cancer, type 2 dm, hyperlipidemia  multiple episodes of small sub 0.5 cm clots ( picture on phone). Urine is light blood tinged clear yellow. 2/1, 2/2  Denies history of nephrolithiasis  Hx of prostate cancer s/p radiation. Last psa 10/2023 was \" 0.001\"        Health Maintenance:     ROS:  Review of Systems   Constitutional:  Negative for chills, fever and weight loss.   HENT:  Negative for hearing loss.    Respiratory:  Negative for shortness of breath and wheezing.    Cardiovascular:  Negative for chest pain and palpitations.   Gastrointestinal:  Negative for nausea and vomiting.   Genitourinary:  Positive for hematuria. Negative for flank pain, frequency and urgency.   Skin:  Negative for rash.   Neurological:  Negative for dizziness and headaches.       Objective:     Exam:  /60   Pulse 80   Temp 37 °C (98.6 °F) (Temporal)   Resp 16   Ht 1.676 m (5' 6\")   Wt 75.8 kg (167 lb 1.7 oz)   SpO2 97%   BMI 26.97 kg/m²  Body mass index is 26.97 kg/m².    Physical Exam  Constitutional:       Appearance: Normal appearance.   Cardiovascular:      Rate and Rhythm: Normal rate and regular rhythm.   Pulmonary:      Effort: Pulmonary effort is normal.      Breath sounds: Normal breath sounds.   Abdominal:      General: There is no distension.      Palpations: Abdomen is soft.      Tenderness: There is no abdominal tenderness. There is no right CVA tenderness or left CVA tenderness.   Musculoskeletal:      Cervical back: Normal range of motion and neck supple.   Lymphadenopathy:      Cervical: No cervical adenopathy.   Neurological:      Mental Status: He is alert.     Labs:     Assessment & Plan:     73 y.o. male with the following -     1. Type 2 diabetes mellitus with complication, without long-term current use of insulin (HCC)  Chronic, stable  - Diabetic Monofilament LE Exam  - MICROALBUMIN CREAT RATIO URINE; " Future    2. Gross hematuria  Acute, undiagnosed unknown prognosis  - bmp - check renal function  - urgent CT scan and urology referral   - POCT Urinalysis  - Referral to Urology  - Basic Metabolic Panel; Future  - URINALYSIS; Future  - CT-ABDOMEN & PELVIS UROGRAM; Future    3. Prostate cancer  Chronic stable    4 retinopathy 2/2/ dm  Chronic stable follows with ophthalmology     HCC Gap Form    Diagnosis to address: C61 - Prostate cancer (HCC)  Assessment and plan: Chronic, stable, as based on today's assessment and impact on other conditions evaluated today. Continue with current treatment plan[facility]-up with specialist as directed, but at least annually.  Diagnosis: E11.8 - Type 2 diabetes mellitus with complication, without long-term current use of insulin ([facility])  Assessment and plan: Chronic, stable. Continue with current defined treatment plan: well controlled on current medication . Follow-up at least annually.  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 Continue with current treatment plan: follow with ophthalmology  Follow-up with specialist as directed, but at least annually.  Last edited 02/02/24 10:16 PST by Adrián Weems M.D.                Return in about 3 months (around 5/2/2024), or if symptoms worsen or fail to improve.    Please note that this dictation was created using voice recognition software. I have made every reasonable attempt to correct obvious errors, but I expect that there are errors of grammar and possibly content that I did not discover before finalizing the note.

## 2024-02-03 ENCOUNTER — HOSPITAL ENCOUNTER (OUTPATIENT)
Dept: RADIOLOGY | Facility: MEDICAL CENTER | Age: 74
End: 2024-02-03
Attending: STUDENT IN AN ORGANIZED HEALTH CARE EDUCATION/TRAINING PROGRAM
Payer: MEDICARE

## 2024-02-03 DIAGNOSIS — R31.0 GROSS HEMATURIA: ICD-10-CM

## 2024-02-03 PROCEDURE — 74178 CT ABD&PLV WO CNTR FLWD CNTR: CPT

## 2024-02-03 PROCEDURE — 700117 HCHG RX CONTRAST REV CODE 255: Performed by: STUDENT IN AN ORGANIZED HEALTH CARE EDUCATION/TRAINING PROGRAM

## 2024-02-03 RX ADMIN — IOHEXOL 100 ML: 350 INJECTION, SOLUTION INTRAVENOUS at 08:13

## 2024-02-07 ENCOUNTER — OFFICE VISIT (OUTPATIENT)
Dept: MEDICAL GROUP | Facility: MEDICAL CENTER | Age: 74
End: 2024-02-07
Payer: MEDICARE

## 2024-02-07 VITALS
DIASTOLIC BLOOD PRESSURE: 50 MMHG | TEMPERATURE: 97.4 F | OXYGEN SATURATION: 94 % | HEIGHT: 66 IN | BODY MASS INDEX: 27.32 KG/M2 | SYSTOLIC BLOOD PRESSURE: 110 MMHG | WEIGHT: 170 LBS | HEART RATE: 74 BPM

## 2024-02-07 DIAGNOSIS — E11.8 TYPE 2 DIABETES MELLITUS WITH COMPLICATION, WITHOUT LONG-TERM CURRENT USE OF INSULIN (HCC): ICD-10-CM

## 2024-02-07 DIAGNOSIS — R31.0 GROSS HEMATURIA: ICD-10-CM

## 2024-02-07 PROCEDURE — 99214 OFFICE O/P EST MOD 30 MIN: CPT | Performed by: STUDENT IN AN ORGANIZED HEALTH CARE EDUCATION/TRAINING PROGRAM

## 2024-02-07 PROCEDURE — 3078F DIAST BP <80 MM HG: CPT | Performed by: STUDENT IN AN ORGANIZED HEALTH CARE EDUCATION/TRAINING PROGRAM

## 2024-02-07 PROCEDURE — 3074F SYST BP LT 130 MM HG: CPT | Performed by: STUDENT IN AN ORGANIZED HEALTH CARE EDUCATION/TRAINING PROGRAM

## 2024-02-07 RX ORDER — LANCETS 30 GAUGE
EACH MISCELLANEOUS
Qty: 100 EACH | Refills: 3 | Status: SHIPPED | OUTPATIENT
Start: 2024-02-07

## 2024-02-07 NOTE — PROGRESS NOTES
"Subjective:     CC:  follow up lab and imaging    HPI:   Ger presents today with    Pmh of prostate cancer, type 2 dm, hyperlipidemia     Last sen last week for gross hematuria with clots on 2/1 and 2/2  Today he reports has not noted gross hematuria since 2/3/2024 ~ noon.   Imaging changed to CT ab pelvis with contrast which showed diverticulosis, bladder/ urogram appear normal to radiolog, I did not see evidence of mass in bladder, renal cyst.   - renal function stable, mild microalbuminuria  - UA with > 150 RBC    T2DM   - last A1c 7.3, discuss with patient to work on diet and exercise, his A1c seems to fluctuate between 6-7.         Health Maintenance:     ROS:  ROS at baseline     Objective:     Exam:  /50 (BP Location: Left arm, Patient Position: Sitting)   Pulse 74   Temp 36.3 °C (97.4 °F) (Temporal)   Ht 1.676 m (5' 6\")   Wt 77.1 kg (170 lb)   SpO2 94%   BMI 27.44 kg/m²  Body mass index is 27.44 kg/m².    Physical Exam  Constitutional:       Appearance: Normal appearance.   HENT:      Head: Normocephalic and atraumatic.   Pulmonary:      Effort: No respiratory distress.   Musculoskeletal:      Cervical back: Normal range of motion and neck supple.   Neurological:      Mental Status: He is alert.   Psychiatric:         Mood and Affect: Mood normal.         Behavior: Behavior normal.           Labs:     Assessment & Plan:     73 y.o. male with the following -     1. Gross hematuria  Acute, stable  Undiagnosed unknown prognosis  Gross hematuria with clots  Renal function stable, no cast noted  Less likely glomerular issue.   Ct scan reviewed with patient lab reviewed  Urology referral processed this afternoon   Instructed patietn to call     2. Type 2 diabetes mellitus with complication, without long-term current use of insulin (HCC)  Chronic, stable  Continue current medication   - Referral to Nutrition Services  - Blood Glucose Test Strips; Use one One Touch Ultra strip to test blood sugar once " daily .  Dispense: 100 Strip; Refill: 3  - Lancets; Use one One touch delica pllus 33g lancet to test blood sugar once daily .  Dispense: 100 Each; Refill: 3      Return if symptoms worsen or fail to improve, for pcp .    Please note that this dictation was created using voice recognition software. I have made every reasonable attempt to correct obvious errors, but I expect that there are errors of grammar and possibly content that I did not discover before finalizing the note.

## 2024-02-08 DIAGNOSIS — E11.8 TYPE 2 DIABETES MELLITUS WITH COMPLICATION, WITHOUT LONG-TERM CURRENT USE OF INSULIN (HCC): ICD-10-CM

## 2024-02-29 DIAGNOSIS — E11.8 TYPE 2 DIABETES MELLITUS WITH COMPLICATION, WITHOUT LONG-TERM CURRENT USE OF INSULIN (HCC): ICD-10-CM

## 2024-03-01 NOTE — TELEPHONE ENCOUNTER
Received request via: Pharmacy    Was the patient seen in the last year in this department? Yes    Does the patient have an active prescription (recently filled or refills available) for medication(s) requested? No    Pharm[facility] Name[facility]    Does the patient have [facility] and need 100 day supply (blood pressure, diabetes and cholesterol meds only)? Patient does not have SCP

## 2024-03-14 ENCOUNTER — OFFICE VISIT (OUTPATIENT)
Dept: UROLOGY | Facility: MEDICAL CENTER | Age: 74
End: 2024-03-14
Payer: MEDICARE

## 2024-03-14 VITALS
SYSTOLIC BLOOD PRESSURE: 130 MMHG | TEMPERATURE: 97.8 F | HEART RATE: 70 BPM | HEIGHT: 66 IN | BODY MASS INDEX: 27.42 KG/M2 | WEIGHT: 170.6 LBS | DIASTOLIC BLOOD PRESSURE: 85 MMHG | OXYGEN SATURATION: 97 %

## 2024-03-14 DIAGNOSIS — R31.9 HEMATURIA, UNSPECIFIED TYPE: ICD-10-CM

## 2024-03-14 DIAGNOSIS — Z85.46 HISTORY OF PROSTATE CANCER: ICD-10-CM

## 2024-03-14 DIAGNOSIS — Z92.3 HISTORY OF RADIATION THERAPY: ICD-10-CM

## 2024-03-14 PROCEDURE — 3079F DIAST BP 80-89 MM HG: CPT | Performed by: UROLOGY

## 2024-03-14 PROCEDURE — 99203 OFFICE O/P NEW LOW 30 MIN: CPT | Performed by: UROLOGY

## 2024-03-14 PROCEDURE — 3075F SYST BP GE 130 - 139MM HG: CPT | Performed by: UROLOGY

## 2024-03-14 NOTE — PROGRESS NOTES
Chief Complaint: gross hematuria    HPI: Ger Dover is a pleasant 73 y.o. male with a history of prostate cancer treated 3 years ago with EBRT at [facility]; he has done very well subsequently, with an undetectable PSA and without significant urinary bother.    On 1/31 he first noted painless gross hematuria; he continued to have episodes over the next few days, and the hematuria then cleared spontaneously.    Earlier in the year, Mr. Dover had pneumonia, and he reports having had a prolonged cough as he recovered. Otherwise there was no particular straining or physical activity he recalls around the start of the hematuria.     He had a CT urogram on 2/3/24; I reviewed the images in detail and noted only some simple renal cysts, and a possible median lobe of the prostate with intraprostatic extension, but no evidence of malignancy.      Symptoms:  Frequency: no  Urgency: no  Nocturia: no  Stress incontinence: no  Urge incontinence: no  Dysuria: no  Gross hematuria: no  Weakened stream: no  Strain to empty: no      Past Medical History:  Past Medical History:   Diagnosis Date    Diabetes (HCC)     Hyperlipidemia     Hypertension        Past Surgical History:  Past Surgical History:   Procedure Laterality Date    ARTHROPLASTY Right     right high     HERNIA REPAIR      bilateral inguinal, 4 total starting at age 7 thru age 46    OTHER      sx for pyloric stenosis at 7wk of age       Family History:  Family History   Problem Relation Age of Onset    Hypertension Mother     Hyperlipidemia Mother     Heart Disease Father     Hypertension Father     Cancer Paternal Grandfather         oral cancer/tobacco       Social History:  Social History     Socioeconomic History    Marital status:      Spouse name: Not on file    Number of children: Not on file    Years of education: Not on file    Highest education level: Not on file   Occupational History    Not on file   Tobacco Use    Smoking status: Former     Types:  Cigars, Cigarettes    Smokeless tobacco: Never   Vaping Use    Vaping Use: Never used   Substance and Sexual Activity    Alcohol use: Yes     Alcohol/week: 4.2 - 8.4 oz     Types: 7 - 14 Glasses of wine per week    Drug use: No    Sexual activity: Not on file   Other Topics Concern    Not on file   Social History Narrative    Not on file     Social Determinants of Health     Financial Resource Strain: Not on file   Food Insecurity: Not on file   Transportation Needs: Not on file   Physical Activity: Not on file   Stress: Not on file   Social Connections: Not on file   Intimate Partner Violence: Not on file   Housing Stability: Not on file       Medications:  Current Outpatient Medications   Medication Sig Dispense Refill    metformin (GLUCOPHAGE) 1000 MG tablet TAKE 1 TABLET BY MOUTH TWICE  DAILY WITH MEALS 180 Tablet 3    Blood Glucose Test Strips Use one One Touch Ultra strip to test blood sugar once daily . 100 Strip 3    Lancets Use one One touch delica pllus 33g lancet to test blood sugar once daily . 100 Each 3    amLODIPine (NORVASC) 10 MG Tab TAKE 1 TABLET BY MOUTH DAILY 90 Tablet 3    benzonatate (TESSALON) 100 MG Cap TAKE 1 CAPSULE BY MOUTH THREE TIMES A DAY AS NEEDED FOR COUGH FOR UP TO 7 DAYS      ONETOUCH ULTRA strip TEST ONCE DAILY. E11.42      Sulfacetamide Sodium-Sulfur 10-5 % Liquid       rosuvastatin (CRESTOR) 20 MG Tab TAKE 1 TABLET BY MOUTH IN THE  EVENING 90 Tablet 3    lisinopril (PRINIVIL) 40 MG tablet TAKE 1 TABLET BY MOUTH DAILY 90 Tablet 3    fluorouracil (EFUDEX) 5 % cream       VITAMIN D, CHOLECALCIFEROL, PO Take 500 mcg by mouth every day.      Empagliflozin (JARDIANCE) 25 MG Tab Take 1 Tablet by mouth every day. 90 Tablet 3    Blood Glucose Meter Kit Test blood sugar as recommended by provider. One Touch Ultra blood glucose monitoring kit. 1 Kit 0    aspirin 81 MG tablet Take 81 mg by mouth every day.      Multiple Vitamins-Minerals (CENTRUM SILVER 50+MEN) Tab Take  by mouth.       No  current facility-administered medications for this visit.       Allergies:  No Known Allergies    Review of Systems:  Constitutional: Negative for fever, chills and malaise/fatigue.   HENT: Negative for congestion.    Eyes: Negative for pain.   Respiratory: Negative for cough and shortness of breath.    Cardiovascular: Negative for leg swelling.   Gastrointestinal: Negative for nausea, vomiting, abdominal pain and diarrhea.   Genitourinary: Negative for dysuria and hematuria.   Skin: Negative for rash.   Neurological: Negative for dizziness, focal weakness and headaches.   Endo/Heme/Allergies: Does not bruise/bleed easily.   Psychiatric/Behavioral: Negative for depression.  The patient is not nervous/anxious.        Physical Exam:  Vitals:    03/14/24 1543   BP: 130/85   Pulse: 70   Temp: 36.6 °C (97.8 °F)   SpO2: 97%       GENERAL: well appearing, well nourished, NAD  RESP: respiratory effort normal  ABDOMEN: soft, nontender, nondistended, no masses or organomegaly  HERNIAS: no hernias found on exam  SKIN/LYMPH: normal coloration and turgor, no suspicious skin lesions noted  NEURO/PSYCH: alert, oriented, normal speech, no focal findings or movement disorder noted  EXTREMITIES: peripheral pulses normal, no pedal edema, no clubbing or cyanosis      Data Review:    Labs:  POCT UA   Lab Results   Component Value Date/Time    POCCOLOR Yellow 02/02/2024 10:06 AM    POCAPPEAR Cloudy 02/02/2024 10:06 AM    POCLEUKEST Negative 02/02/2024 10:06 AM    POCNITRITE Negative 02/02/2024 10:06 AM    POCUROBILIGE 0.2 02/02/2024 10:06 AM    POCPROTEIN Negative 02/02/2024 10:06 AM    POCURPH 5.5 02/02/2024 10:06 AM    POCBLOOD Large 02/02/2024 10:06 AM    POCSPGRV 1.010 02/02/2024 10:06 AM    POCKETONES Negative 02/02/2024 10:06 AM    POCBILIRUBIN Negative 02/02/2024 10:06 AM    POCGLUCUA >=1,000 02/02/2024 10:06 AM      CBC   Lab Results   Component Value Date/Time    WBC 6.4 10/29/2019 1135    RBC 4.98 10/29/2019 1135    HEMOGLOBIN  "15.3 10/29/2019 1135    HEMATOCRIT 45.8 10/29/2019 1135    MCV 92.0 10/29/2019 1135    MCH 30.7 10/29/2019 1135    MCHC 33.4 (L) 10/29/2019 1135    RDW 42.1 10/29/2019 1135    MPV 10.4 10/29/2019 1135    LYMPHOCYTES 25.30 10/29/2019 1135    LYMPHS 1.61 10/29/2019 1135    MONOCYTES 7.20 10/29/2019 1135    MONOS 0.46 10/29/2019 1135    EOSINOPHILS 0.30 10/29/2019 1135    EOS 0.02 10/29/2019 1135    BASOPHILS 0.50 10/29/2019 1135    BASO 0.03 10/29/2019 1135    NRBC 0.00 10/29/2019 1135     CMP   Lab Results   Component Value Date/Time    SODIUM 140 2024 1106    POTASSIUM 4.3 2024 1106    CHLORIDE 102 2024 1106    CO2 24 2024 1106    ANION 14.0 2024 1106    GLUCOSE 118 (H) 2024 1106    BUN 15 2024 1106    CREATININE 0.83 2024 1106    GFRCKD 93 2024 1106    CALCIUM 10.1 2024 1106    ASTSGOT 40 10/29/2019 1135    ALTSGPT 50 10/29/2019 1135    ALKPHOSPHAT 56 10/29/2019 1135    TBILIRUBIN 0.7 10/29/2019 1135    ALBUMIN 4.8 10/29/2019 1135    TOTPROTEIN 7.9 10/29/2019 1135    GLOBULIN 3.1 10/29/2019 1135    AGRATIO 1.5 10/29/2019 1135     INFERTILITY No results found for: \"FSH\", \"LH\", \"PROLACT\", \"ESTRADL\", \"TESTOSTERONE\", \"FREETESTOST\", \"TESTLCMS\", \"SEXHORM\"  PSA   Lab Results   Component Value Date/Time    PSATOTAL 4.1 (H) 2019 0846       Imagin/3/2024 7:42 AM     HISTORY/REASON FOR EXAM:  asymptomatic gross hematuria with clots x2 days, hx of prostate cancer , low psa.        TECHNIQUE/EXAM DESCRIPTION:  CT Urogram     Initial precontrast images were obtained from the diaphragmatic domes through the pubic symphysis using helical technique.  Following this, 100 mL of Omnipaque 350 nonionic contrast was administered, and postcontrast nephrographic phase thin-section helical scanning obtained from the diaphragmatic domes through the pubic symphysis. Additional 10-minute delayed imaging is   performed from the diaphragmatic domes through the pubic " symphysis to evaluate the ureters. Coronal MIP reconstructions of the delayed phase are also performed. Low dose optimization technique was utilized for this CT exam including automated exposure   control and adjustment of the mA and/or kV according to patient size.     COMPARISON: None.     FINDINGS:  Lung: Visualized lung bases are clear.  Liver: Fatty infiltration within the liver.  Spleen: Normal in size, without focal lesion  Adrenal glands: Normal  Pancreas: Normal  Gallbladder: No radiodense stone visualized.  Biliary: No biliary duct dilation visualized.  Kidneys: Simple bilateral renal cysts, largest measuring 3 cm. No renal stone or hydronephrosis. No filling defect on the later radiographic phase imaging.  Vasculature: Nonaneurysmal  Lymph nodes: No adenopathy  Bowel: Colonic diverticulosis  Peritoneum: No ascites  Pelvis: Previous right inguinal hernia repair are noted. Urinary bladder appears normal.  Musculoskeletal: Right hip arthroplasty noted. Left hip DJD.     IMPRESSION:        1. No acute process seen.  2. Simple bilateral renal cysts, which do not require follow-up.  3. Hepatic steatosis.  4. Colonic diverticulosis.           Bosniak classification: Bosniak 1    Assessment: 73 y.o. male with a history of a few days of painless gross hematuria in early February 2024, in the setting of a history of prostate cancer treated with radiation therapy in 2021.     We discussed the differential diagnosis of gross and microscopic hematuria, including urinary tract infection, renal or bladder stones, trauma, rare entities like renal arteriovenous malformations or aneurysms, benign growths of the urinary tract, and malignancy including renal cell carcinoma, upper tract urothelial carcinoma, bladder cancer, and urethral cancer.      Workup should include imaging of the upper urinary tract with and without contrast including delayed images. This is most typically done with a CT urogram, but when this is not  possible (ie, iodine contrast allergy, CKD), can also be done with an MR urogram. We also recommend evaluation the lower urinary tract with cystoscopy, as the sensitivity of imaging studies for smaller lesions in the bladder and urethra is not sufficient.     In his case, the most likely etiology of the bleeding is radiation effect on the bladder or prostatic urethra, perhaps triggered by Valsalva (coughing), but given the small possibility of bladder cancer (either from his history of smoking pipes and cigars, or as a secondary malignancy from radiation) I recommended we do proceed with cystoscopy.       Plan:    -Schedule cystosopy      Jared Richmond MD

## 2024-04-11 ENCOUNTER — PROCEDURE VISIT (OUTPATIENT)
Dept: UROLOGY | Facility: MEDICAL CENTER | Age: 74
End: 2024-04-11
Payer: MEDICARE

## 2024-04-11 VITALS
OXYGEN SATURATION: 97 % | WEIGHT: 166 LBS | HEART RATE: 66 BPM | BODY MASS INDEX: 26.79 KG/M2 | SYSTOLIC BLOOD PRESSURE: 118 MMHG | DIASTOLIC BLOOD PRESSURE: 70 MMHG | TEMPERATURE: 98.2 F

## 2024-04-11 DIAGNOSIS — Z85.46 HISTORY OF PROSTATE CANCER: ICD-10-CM

## 2024-04-11 DIAGNOSIS — R31.0 GROSS HEMATURIA: ICD-10-CM

## 2024-04-11 DIAGNOSIS — Z92.3 HISTORY OF RADIATION THERAPY: ICD-10-CM

## 2024-04-11 PROCEDURE — 52000 CYSTOURETHROSCOPY: CPT | Performed by: UROLOGY

## 2024-04-11 RX ORDER — LIDOCAINE HYDROCHLORIDE 20 MG/ML
JELLY TOPICAL ONCE
Status: COMPLETED | OUTPATIENT
Start: 2024-04-11 | End: 2024-04-11

## 2024-04-11 RX ADMIN — LIDOCAINE HYDROCHLORIDE 20 MG: 20 JELLY TOPICAL at 13:48

## 2024-04-11 NOTE — PROGRESS NOTES
Flexible Cystoscopy Report    Patient name: Ger Dover    Age: 74 y.o.    Procedure: Flexible cystourethroscopy    Pre-procedure diagnosis: Gross hematuria, history of prostate cancer    Post-procedure diagnosis: normal bladder and urethra    Procedure indications: Ger Dover is a 74 y.o. male with a history of prostate cancer treated with radiotherapy in 2021, who presented with painless gross hematuria on 1/31/24. Upper tract imaging with CT urogram was unremarkable. Here today for evaluation of the lower urinary tract with cystourethroscopy.     Procedure details: After providing a urine sample for a urinalysis to rule out active urinary tract infection, the patient was brought to the procedure room and placed in supine position. The external genitalia were then prepped and draped in usual sterile fashion. Lidocaine jelly was administered via the urethral meatus and held in place for approximately two minutes for local anesthesia.    After a procedure time-out to confirm the proper patient, procedure, consent, and availability of all necessary equipment, the case began by inserting a 16-Malay flexible cystoscope via the urethral meatus. Findings included:    Anterior urethra: normal; no strictures or mucosal abnormalities  Posterior urethra: normal membranous urethra; prostatic urethra short and without hypertrophy; there are some hypertrophic subepithelial veins   Bladder: Bladder mucosa normal without masses, diverticuli, or trabeculations. No stones or other foreign bodies noted. Bilateral ureteral orifices were identified and found to have efflux of clear yellow urine.     The cystoscope was then carefully withdrawn from the bladder and urethra. The patient was cleaned and dried and allowed to void.     Assessment/Plan:  Normal cystoscopy; no concern for malignancy.       Jared Richmond MD

## 2024-08-06 DIAGNOSIS — E78.5 DYSLIPIDEMIA: ICD-10-CM

## 2024-08-06 DIAGNOSIS — I10 ESSENTIAL HYPERTENSION: ICD-10-CM

## 2024-08-12 RX ORDER — ROSUVASTATIN CALCIUM 20 MG/1
TABLET, COATED ORAL
Qty: 90 TABLET | Refills: 3 | Status: SHIPPED | OUTPATIENT
Start: 2024-08-12

## 2024-08-12 RX ORDER — LISINOPRIL 40 MG/1
TABLET ORAL
Qty: 90 TABLET | Refills: 3 | Status: SHIPPED | OUTPATIENT
Start: 2024-08-12

## 2024-09-11 ENCOUNTER — DOCUMENTATION (OUTPATIENT)
Dept: HEALTH INFORMATION MANAGEMENT | Facility: OTHER | Age: 74
End: 2024-09-11
Payer: MEDICARE

## 2024-09-17 ENCOUNTER — OFFICE VISIT (OUTPATIENT)
Dept: MEDICAL GROUP | Facility: MEDICAL CENTER | Age: 74
End: 2024-09-17
Payer: MEDICARE

## 2024-09-17 VITALS
HEART RATE: 74 BPM | TEMPERATURE: 98 F | BODY MASS INDEX: 27.49 KG/M2 | HEIGHT: 66 IN | SYSTOLIC BLOOD PRESSURE: 104 MMHG | RESPIRATION RATE: 14 BRPM | WEIGHT: 171.08 LBS | OXYGEN SATURATION: 96 % | DIASTOLIC BLOOD PRESSURE: 60 MMHG

## 2024-09-17 DIAGNOSIS — I10 ESSENTIAL HYPERTENSION: ICD-10-CM

## 2024-09-17 DIAGNOSIS — E78.5 DYSLIPIDEMIA: ICD-10-CM

## 2024-09-17 DIAGNOSIS — E11.8 TYPE 2 DIABETES MELLITUS WITH COMPLICATION, WITHOUT LONG-TERM CURRENT USE OF INSULIN (HCC): ICD-10-CM

## 2024-09-17 LAB
HBA1C MFR BLD: 7.2 % (ref ?–5.8)
POCT INT CON NEG: NEGATIVE
POCT INT CON POS: POSITIVE

## 2024-09-17 PROCEDURE — 3078F DIAST BP <80 MM HG: CPT | Performed by: FAMILY MEDICINE

## 2024-09-17 PROCEDURE — 99214 OFFICE O/P EST MOD 30 MIN: CPT | Performed by: FAMILY MEDICINE

## 2024-09-17 PROCEDURE — 83036 HEMOGLOBIN GLYCOSYLATED A1C: CPT | Performed by: FAMILY MEDICINE

## 2024-09-17 PROCEDURE — 3074F SYST BP LT 130 MM HG: CPT | Performed by: FAMILY MEDICINE

## 2024-09-17 NOTE — PROGRESS NOTES
CC: Diabetes, hypertension, hyperlipidemia, extra heartbeats    HPI:   Ger presents today to discuss the following:    Type 2 diabetes mellitus with complication, without long-term current use of insulin ([facility])  Patient's A1c today 7.2%, however it was 6.9% last visit.  Denies polyuria and polydipsia.  Has been metformin 1000 mg twice a day, and Jardiance 25 mg daily.  No side effects    Essential hypertension  Blood pressure has been adequately controlled on lisinopril 40 mg daily, and amlodipine 10 mg daily.  No side effects    Dyslipidemia  He has been tolerating the statin. Denies muscle pain LFTs has been normal.  Last LDL was 78.  Currently on rosuvastatin 20 mg daily.    Extra heartbeats  Patient reported some extra heartbeats a month ago, states for few minutes and went away.  At that time he has not had any symptoms(denies any dizziness, palpitation, or shortness of breath.    Patient Active Problem List    Diagnosis Date Noted    Mild cognitive impairment 10/14/2021    Prostate cancer ([facility]) 01/22/2021    Retinopathy due to secondary DM ([facility]) 01/14/2020    Essential hypertension 07/03/2019    Type 2 diabetes mellitus with complication, without long-term current use of insulin ([facility]) 07/03/2019    Preventative health care 07/03/2019    Dyslipidemia 07/03/2019    History of total right hip replacement 07/03/2019    Bilateral hearing loss 07/03/2019    History of pyloric stenosis as a child 07/03/2019       Current Outpatient Medications   Medication Sig Dispense Refill    lisinopril (PRINIVIL) 40 MG tablet TAKE 1 TABLET BY MOUTH DAILY 90 Tablet 3    rosuvastatin (CRESTOR) 20 MG Tab TAKE 1 TABLET BY MOUTH IN THE  EVENING 90 Tablet 3    metformin (GLUCOPHAGE) 1000 MG tablet TAKE 1 TABLET BY MOUTH TWICE  DAILY WITH MEALS 180 Tablet 3    Blood Glucose Test Strips Use one One Touch Ultra strip to test blood sugar once daily . 100 Strip 3    Lancets Use one One touch delica pllus 33g lancet to test blood sugar  "once daily . 100 Each 3    amLODIPine (NORVASC) 10 MG Tab TAKE 1 TABLET BY MOUTH DAILY 90 Tablet 3    benzonatate (TESSALON) 100 MG Cap TAKE 1 CAPSULE BY MOUTH THREE TIMES A DAY AS NEEDED FOR COUGH FOR UP TO 7 DAYS      ONETOUCH ULTRA strip TEST ONCE DAILY. E11.42      Sulfacetamide Sodium-Sulfur 10-5 % Liquid       fluorouracil (EFUDEX) 5 % cream       VITAMIN D, CHOLECALCIFEROL, PO Take 500 mcg by mouth every day.      Empagliflozin (JARDIANCE) 25 MG Tab Take 1 Tablet by mouth every day. 90 Tablet 3    Blood Glucose Meter Kit Test blood sugar as recommended by provider. One Touch Ultra blood glucose monitoring kit. 1 Kit 0    aspirin 81 MG tablet Take 81 mg by mouth every day.      Multiple Vitamins-Minerals (CENTRUM SILVER 50+MEN) Tab Take  by mouth.       No current facility-administered medications for this visit.         Allergies as of 09/17/2024    (No Known Allergies)        ROS: Denies any chest pain, Shortness of breath, Changes bowel or bladder, Lower extremity edema.    Physical Exam:  /60   Pulse 74   Temp 36.7 °C (98 °F)   Resp 14   Ht 1.676 m (5' 6\")   Wt 77.6 kg (171 lb 1.2 oz)   SpO2 96%   BMI 27.61 kg/m²   Gen.: Well-developed, well-nourished, no apparent distress,pleasant and cooperative with the examination  Skin:  Warm and dry with good turgor. No rashes or suspicious lesions in visible areas  HEENT:Sinuses nontender with palpation, TMs clear, nares patent with pink mucosa and clear rhinorrhea,no septal deviation ,polyps or lesions. lips without lesions, oropharynx clear.  Neck: Trachea midline,no masses or adenopathy. No JVD.  Cor: Regular rate and rhythm without murmur, gallop or rub.  Lungs: Respirations unlabored.Clear to auscultation with equal breath sounds bilaterally. No wheezes, rhonchi.  Extremities: No cyanosis, clubbing or edema.      Assessment and Plan.   74 y.o. male     1. Type 2 diabetes mellitus with complication, without long-term current use of insulin " (HCC)  Controlled.  A1c today 7.2%, however it was 6.9% last visit.  Patient is counseled on lifestyle modification.  Continue metformin 1000 mg twice a day, and Jardiance 25 mg daily.    - POCT Hemoglobin A1C  - CBC WITH DIFFERENTIAL; Future  - MICROALBUMIN CREAT RATIO URINE; Future  - Comp Metabolic Panel; Future    2. Essential hypertension  Controlled.  Continue on lisinopril 40 mg daily, and amlodipine 10 mg daily    - CBC WITH DIFFERENTIAL; Future  - MICROALBUMIN CREAT RATIO URINE; Future  - Comp Metabolic Panel; Future  - TSH; Future    3. Dyslipidemia  He has been tolerating the statin. Denies muscle pain LFTs has been normal  Continue rosuvastatin 20 mg daily.    - Lipid Profile; Future  - TSH; Future    4. Extra heartbeats  Patient reported some extra heartbeats a month ago, states for few minutes and went away.  At that time he has not had any symptoms(denies any dizziness, palpitation, or shortness of breath.  Will continue to monitor.  Will send patient for cardiac monitor if it becomes more frequent.

## 2024-11-04 DIAGNOSIS — I10 ESSENTIAL HYPERTENSION: ICD-10-CM

## 2024-11-04 RX ORDER — AMLODIPINE BESYLATE 10 MG/1
10 TABLET ORAL DAILY
Qty: 90 TABLET | Refills: 0 | Status: SHIPPED | OUTPATIENT
Start: 2024-11-04

## 2024-11-04 NOTE — TELEPHONE ENCOUNTER
Received request via: Patient    Was the patient seen in the last year in this department? Yes    Does the patient have an active prescription (recently filled or refills available) for medication(s) requested? No    Pharm[facility] Name: [facility]    Does the patient have group home Plus and need 100-day supply? (This applies to ALL medications) Patient does not have SCP

## 2024-12-24 DIAGNOSIS — I10 ESSENTIAL HYPERTENSION: ICD-10-CM

## 2024-12-24 RX ORDER — AMLODIPINE BESYLATE 10 MG/1
10 TABLET ORAL DAILY
Qty: 90 TABLET | Refills: 0 | Status: SHIPPED | OUTPATIENT
Start: 2024-12-24

## 2024-12-24 NOTE — TELEPHONE ENCOUNTER
Received request via: Pharmacy    Was the patient seen in the last year in this department? Yes     amLODIPine (NORVASC) 10 MG Tab     Does the patient have an active prescription (recently filled or refills available) for medication(s) requested? No    Pharmacy Name: [facility] - [place], [place] - [address] [facility]     Does the patient have assisted Plus and need 100-day supply? (This applies to ALL medications) Patient does not have SCP

## 2025-01-29 DIAGNOSIS — E11.8 TYPE 2 DIABETES MELLITUS WITH COMPLICATION, WITHOUT LONG-TERM CURRENT USE OF INSULIN (HCC): ICD-10-CM

## 2025-01-29 NOTE — TELEPHONE ENCOUNTER
Received request via: Pharmacy    Was the patient seen in the last year in this department? Yes    Does the patient have an active prescription (recently filled or refills available) for medication(s) requested? No      [facility] - [place], [place] - [address]  [address]  [facility] [address]  [facility] [address]  Phone: [phone] Fax: [phone]      Does the patient have longterm Plus and need 100-day supply? (This applies to ALL medications) Patient does not have SCP

## 2025-02-03 ENCOUNTER — APPOINTMENT (OUTPATIENT)
Dept: DERMATOLOGY | Facility: IMAGING CENTER | Age: 75
End: 2025-02-03
Payer: MEDICARE

## 2025-02-03 DIAGNOSIS — D48.9 NEOPLASM OF UNCERTAIN BEHAVIOR: ICD-10-CM

## 2025-02-03 DIAGNOSIS — L57.0 ACTINIC KERATOSIS: ICD-10-CM

## 2025-02-03 PROCEDURE — 17000 DESTRUCT PREMALG LESION: CPT | Mod: 59 | Performed by: NURSE PRACTITIONER

## 2025-02-03 PROCEDURE — 11102 TANGNTL BX SKIN SINGLE LES: CPT | Performed by: NURSE PRACTITIONER

## 2025-02-03 PROCEDURE — 17003 DESTRUCT PREMALG LES 2-14: CPT | Performed by: NURSE PRACTITIONER

## 2025-02-03 NOTE — PROGRESS NOTES
DERMATOLOGY NOTE  NEW VISIT       Chief complaint: Establish Care     HPI: skin lesion   Location: forehead   Time present:  x 1 month   Painful lesion: No  Itching lesion: Yes  Enlarging lesion: No  Anything make it better or worse?no     HPI: skin lesion   Location: left forearm   Time present: x 3 months  Painful lesion: No  Itching lesion: No  Enlarging lesion: No  Anything make it better or worse?no     HPI: skin lesion   Location: left foot   Time present: x 1 month   Painful lesion: No  Itching lesion: No  Enlarging lesion: No  Anything make it better or worse?    Hx of PDT face 10/2024   Hx of AK   History of skin cancer: No  History of precancers/actinic keratoses: Yes, Details:    History of biopsies:No  History of blistering/severe sunburns:Yes, Details: teenager   Family history of skin cancer:No  Family history of atypical moles:No      No Known Allergies     MEDICATIONS:  Medications relevant to specialty reviewed.     REVIEW OF SYSTEMS:   Positive for skin (see HPI)  Negative for fevers and chills       EXAM:  There were no vitals taken for this visit.  Constitutional: Well-developed, well-nourished, and in no distress.     A focused skin exam was performed including the affected areas of the face, L arm, R ankle. Notable findings on exam today listed below and/or in assessment/plan.     Ill-defined erythematous gritty/scaly papules over the forehead and L forearm  Approx. 5 mm pink papule to R medial ankle    IMPRESSION / PLAN:    1. Neoplasm of uncertain behavior  Procedure Note   Procedure: Biopsy by shave technique  Location: R medial ankle  Size: as noted in exam  Preoperative diagnosis:BCC vs other  Risks, benefits and alternatives of procedure discussed, verbal consent obtained for photo (see chart) and written informed consent obtained for procedure. Time out completed. Area of biopsy prepped with alcohol. Anesthesia with 1% lidocaine with epinephrine administered with 30 gauge needle. Shave  biopsy of the site performed. Hemostasis achieved with pressure and aluminum chloride. Vaseline applied to wound with bandage. Patient tolerated procedure well and there were no complications. The specimen was sent to the pathology lab by the staff. Wound care was discussed.      2. Actinic keratosis  CRYOTHERAPY:  Risks (including, but not limited to: skin discoloration, redness, blister, blood blister, recurrence, need for further treatment, infection, scar) and benefits of cryotherapy discussed. Patient verbally agreed to proceed with treatment. 1 cryotherapy freeze thaw cycles of 10 seconds were applied to 2 lesions on forehead and L arm with cryac. Patient tolerated procedure well. Aftercare instructions given--no specific care needed unless irritated during healing process, can apply Vaseline with small band-aid if needed.        Discussed risks associated with shave bx and LN2, Patient verbalized understanding and agrees with plan regarding the above            Please note that this dictation was created using voice recognition software. I have made every reasonable attempt to correct obvious errors, but I expect that there are errors of grammar and possibly content that I did not discover before finalizing the note.      Return to clinic in: Return for Pending Bx results. and as needed for any new or changing skin lesions.

## 2025-02-06 ENCOUNTER — OFFICE VISIT (OUTPATIENT)
Dept: MEDICAL GROUP | Facility: MEDICAL CENTER | Age: 75
End: 2025-02-06
Payer: MEDICARE

## 2025-02-06 VITALS
DIASTOLIC BLOOD PRESSURE: 60 MMHG | SYSTOLIC BLOOD PRESSURE: 124 MMHG | BODY MASS INDEX: 27.64 KG/M2 | WEIGHT: 172 LBS | OXYGEN SATURATION: 96 % | TEMPERATURE: 97.2 F | RESPIRATION RATE: 16 BRPM | HEIGHT: 66 IN | HEART RATE: 69 BPM

## 2025-02-06 DIAGNOSIS — M65.30 TRIGGER FINGER, UNSPECIFIED FINGER, UNSPECIFIED LATERALITY: ICD-10-CM

## 2025-02-06 DIAGNOSIS — I10 ESSENTIAL HYPERTENSION: ICD-10-CM

## 2025-02-06 DIAGNOSIS — L98.9 SKIN LESION: ICD-10-CM

## 2025-02-06 DIAGNOSIS — E11.8 TYPE 2 DIABETES MELLITUS WITH COMPLICATION, WITHOUT LONG-TERM CURRENT USE OF INSULIN (HCC): ICD-10-CM

## 2025-02-06 PROBLEM — H91.93 BILATERAL HEARING LOSS: Status: RESOLVED | Noted: 2019-07-03 | Resolved: 2025-02-06

## 2025-02-06 PROBLEM — Z00.00 PREVENTATIVE HEALTH CARE: Status: RESOLVED | Noted: 2019-07-03 | Resolved: 2025-02-06

## 2025-02-06 LAB
HBA1C MFR BLD: 7.3 % (ref ?–5.8)
POCT INT CON NEG: NEGATIVE
POCT INT CON POS: POSITIVE

## 2025-02-06 PROCEDURE — 3074F SYST BP LT 130 MM HG: CPT | Performed by: STUDENT IN AN ORGANIZED HEALTH CARE EDUCATION/TRAINING PROGRAM

## 2025-02-06 PROCEDURE — 99214 OFFICE O/P EST MOD 30 MIN: CPT | Performed by: STUDENT IN AN ORGANIZED HEALTH CARE EDUCATION/TRAINING PROGRAM

## 2025-02-06 PROCEDURE — 3078F DIAST BP <80 MM HG: CPT | Performed by: STUDENT IN AN ORGANIZED HEALTH CARE EDUCATION/TRAINING PROGRAM

## 2025-02-06 PROCEDURE — 83036 HEMOGLOBIN GLYCOSYLATED A1C: CPT | Performed by: STUDENT IN AN ORGANIZED HEALTH CARE EDUCATION/TRAINING PROGRAM

## 2025-02-06 RX ORDER — AMMONIUM LACTATE 12 G/100G
LOTION TOPICAL
COMMUNITY
Start: 2025-02-05

## 2025-02-06 ASSESSMENT — ENCOUNTER SYMPTOMS
WEIGHT LOSS: 0
NAUSEA: 0
HEADACHES: 0
DIZZINESS: 0
PALPITATIONS: 0
SHORTNESS OF BREATH: 0
WHEEZING: 0
FEVER: 0
CHILLS: 0
VOMITING: 0

## 2025-02-06 ASSESSMENT — PATIENT HEALTH QUESTIONNAIRE - PHQ9: CLINICAL INTERPRETATION OF PHQ2 SCORE: 0

## 2025-02-06 NOTE — PROGRESS NOTES
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   - Fbg in 140-150s    HLD on rosuvastatin    HTN  -lisinopril 40mg daily   -amlodipine 10mg daily     Problem   Skin Lesion   Preventative Health Care (Resolved)   Bilateral Hearing Loss (Resolved)    From febrile illness age 7, associated with seizures at that time only.          Health Maintenance:     ROS:  Review of Systems   Constitutional:  Negative for chills, fever and weight loss.   HENT:  Negative for hearing loss.    Respiratory:  Negative for shortness of breath and wheezing.    Cardiovascular:  Negative for chest pain and palpitations.   Gastrointestinal:  Negative for nausea and vomiting.   Genitourinary:  Negative for frequency and urgency.   Skin:  Negative for rash.   Neurological:  Negative for dizziness and headaches.       Objective:     Exam:  /60 (BP Location: Right arm, Patient Position: Sitting)   Pulse 69   Temp 36.2 °C (97.2 °F) (Temporal)   Resp 16   Ht 1.676 m (5' 6\")   Wt 78 kg (172 lb)   SpO2 96%   BMI 27.76 kg/m²  Body mass index is 27.76 kg/m².    Physical Exam  Constitutional:       Appearance: Normal appearance.   Cardiovascular:      Rate and Rhythm: Normal rate and regular rhythm.   Pulmonary:      Effort: Pulmonary effort is normal.      Breath sounds: Normal breath sounds.   Musculoskeletal:      Cervical back: Normal range of motion and neck supple.   Lymphadenopathy:      Cervical: No cervical adenopathy.   Neurological:      Mental Status: He is alert.           Diabetic foot exam: No lesions or calluses noted. 2+ pedal pulses. Sensation intact with 10 out of 10 on monofilament test.    Labs:     Assessment & Plan:     74 y.o. " male with the following -     1. Type 2 diabetes mellitus with complication, without long-term current use of insulin (HCC)  Chronic, Stable  Patient on metformin 1 g twice daily, Jardiance 25 mg daily obtained, taking without any issues  Blood sugar was 7.1 couple months ago.  Point-of-care A1c today was  - POCT Hemoglobin A1C  - Diabetic Monofilament LE Exam    2. Essential hypertension  Chronic, stable blood pressure well-controlled lisinopril 40 mg, amlodipine 10 mg.  Denies adverse side effect with amlodipine.  Patient will try to bring his blood pressure cuff during next visit to corroborate neck.    3. Skin lesion  Follows with dermatology    4. Trigger finger, unspecified finger, unspecified laterality  Report history of trigger finger in bilateral hands previously has gotten cortisone shots would like to see orthopedic surgeon  - Referral to Orthopedics        Return in about 3 months (around 5/6/2025) for Lab review, Med check.    Please note that this dictation was created using voice recognition software. I have made every reasonable attempt to correct obvious errors, but I expect that there are errors of grammar and possibly content that I did not discover before finalizing the note.

## 2025-02-11 ENCOUNTER — TELEPHONE (OUTPATIENT)
Dept: DERMATOLOGY | Facility: IMAGING CENTER | Age: 75
End: 2025-02-11
Payer: MEDICARE

## 2025-02-11 NOTE — TELEPHONE ENCOUNTER
Spoke with pt regarding results, BCC, margins free. Discussed tx options--ED&C, MOHS, WLE vs monitoring including follow up 6 months. Pt opts to monitor, will call back and schedule appt in October 2025

## 2025-04-23 DIAGNOSIS — I10 ESSENTIAL HYPERTENSION: ICD-10-CM

## 2025-04-24 RX ORDER — AMLODIPINE BESYLATE 10 MG/1
10 TABLET ORAL DAILY
Qty: 90 TABLET | Refills: 0 | Status: SHIPPED | OUTPATIENT
Start: 2025-04-24

## 2025-04-24 NOTE — TELEPHONE ENCOUNTER
Received request via: Pharmacy    Was the patient seen in the last year in this department? YES    Does the patient have an active prescription (recently filled or refills available) for medication(s) requested? No    [facility]    Does the patient have jail Plus and need 100-day supply? (This applies to ALL medications) Patient does not have SCP

## 2025-05-08 ENCOUNTER — OFFICE VISIT (OUTPATIENT)
Dept: MEDICAL GROUP | Facility: MEDICAL CENTER | Age: 75
End: 2025-05-08
Payer: MEDICARE

## 2025-05-08 VITALS
OXYGEN SATURATION: 98 % | TEMPERATURE: 97.6 F | HEART RATE: 73 BPM | SYSTOLIC BLOOD PRESSURE: 104 MMHG | BODY MASS INDEX: 27.53 KG/M2 | WEIGHT: 171.3 LBS | DIASTOLIC BLOOD PRESSURE: 64 MMHG | RESPIRATION RATE: 24 BRPM | HEIGHT: 66 IN

## 2025-05-08 DIAGNOSIS — I10 ESSENTIAL HYPERTENSION: ICD-10-CM

## 2025-05-08 DIAGNOSIS — E11.8 TYPE 2 DIABETES MELLITUS WITH COMPLICATION, WITHOUT LONG-TERM CURRENT USE OF INSULIN (HCC): ICD-10-CM

## 2025-05-08 DIAGNOSIS — R06.83 SNORING: ICD-10-CM

## 2025-05-08 PROCEDURE — 3078F DIAST BP <80 MM HG: CPT | Performed by: STUDENT IN AN ORGANIZED HEALTH CARE EDUCATION/TRAINING PROGRAM

## 2025-05-08 PROCEDURE — 99214 OFFICE O/P EST MOD 30 MIN: CPT | Performed by: STUDENT IN AN ORGANIZED HEALTH CARE EDUCATION/TRAINING PROGRAM

## 2025-05-08 PROCEDURE — 3074F SYST BP LT 130 MM HG: CPT | Performed by: STUDENT IN AN ORGANIZED HEALTH CARE EDUCATION/TRAINING PROGRAM

## 2025-05-08 RX ORDER — KETOCONAZOLE 20 MG/G
CREAM TOPICAL
COMMUNITY
Start: 2025-04-01

## 2025-05-08 ASSESSMENT — ENCOUNTER SYMPTOMS
DIZZINESS: 0
PALPITATIONS: 0
WEIGHT LOSS: 0
CHILLS: 0
NAUSEA: 0
HEADACHES: 0
VOMITING: 0
FEVER: 0
SHORTNESS OF BREATH: 0
WHEEZING: 0

## 2025-05-08 NOTE — PROGRESS NOTES
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   HENT:  Negative for hearing loss.    Respiratory:  Negative for shortness of breath and wheezing.    Cardiovascular:  Negative for chest pain and palpitations.   Gastrointestinal:  Negative for nausea and vomiting.   Genitourinary:  Negative for frequency and urgency.   Skin:  Negative for rash.   Neurological:  Negative for dizziness and headaches.       Objective:     Exam:  /64 (BP Location: Left arm, Patient Position: Sitting, BP Cuff Size: Adult)   Pulse 73   Temp 36.4 °C (97.6 °F) (Temporal)   Resp (!) 24   Ht 1.676 m (5' 6\") Comment: pt reported  Wt 77.7 kg (171 lb 4.8 oz)   SpO2 98%   BMI 27.65 kg/m²  Body mass index is 27.65 kg/m².    Physical Exam  Constitutional:       Appearance: Normal appearance.   Cardiovascular:      Rate and Rhythm: Normal rate and regular rhythm.   Pulmonary:      Effort: Pulmonary effort is normal.      Breath sounds: Normal breath sounds.   Musculoskeletal:      Cervical back: Normal range of motion and neck supple.   Lymphadenopathy:      Cervical: No cervical adenopathy.   Neurological:      Mental Status: He is alert.           Labs:     Assessment & Plan:     75 y.o. male with the following -     1. Type 2 diabetes mellitus with complication, without long-term current use of insulin (HCC)  Chronic stable  On jardiance 25mg daily, metformin 1 g bid taking wo issue    2. Essential hypertension  Chronic stable well controlled on lisiinopril 40mg daily and amlodipine 10mg daily "     3. Snoring  [facility]  - [facility]         Return in about 6 months (around 11/8/2025) for Lab review, Med check.    Please note that this dictation was created using voice recognition software. I have made every reasonable attempt to correct obvious errors, but I expect that there are errors of grammar and possibly content that I did not discover before finalizing the note.

## 2025-05-12 NOTE — Clinical Note
REFERRAL APPROVAL NOTICE         Sent on May 12, 2025                   Ger Dover  [address]  [address]  [place] [address]                   Dear Mr. Dover,    After a careful review of the medical information and benefit coverage, [facility] has processed your referral. See below for additional details.    If applicable, you must be actively enrolled with your insurance for coverage of the authorized service. If you have any questions regarding your coverage, please contact your insurance directly.    REFERRAL INFORMATION   Referral #:  [id number]  Referred-To Department    Referred-By Provider:  Pulmonary and Sleep Medicine    Adrián Weems M.D.   [facility]      [address]  [place] [address]  [phone] [address]  [place] [id number] [phone]    Referral Start Date:  05/08/2025  Referral End Date:   05/08/2026             SCHEDULING  If you do not already have an appointment, please call [phone] to make an appointment.     MORE INFORMATION  If you do not already have a [employer] account, sign up at: [email or web address]  You can access your medical information, make appointments, see lab results, billing information, and more.  If you have questions regarding this referral, please contact  the [facility] Referrals department at:             [phone]. Monday - Friday 8:00AM - 5:00PM.     Sincerely,    [facility]

## 2025-06-11 ENCOUNTER — TELEPHONE (OUTPATIENT)
Dept: HEALTH INFORMATION MANAGEMENT | Facility: OTHER | Age: 75
End: 2025-06-11
Payer: MEDICARE

## 2025-06-25 DIAGNOSIS — I10 ESSENTIAL HYPERTENSION: ICD-10-CM

## 2025-06-30 RX ORDER — AMLODIPINE BESYLATE 10 MG/1
10 TABLET ORAL DAILY
Qty: 90 TABLET | Refills: 3 | Status: SHIPPED | OUTPATIENT
Start: 2025-06-30

## 2025-07-09 ENCOUNTER — HOME STUDY (OUTPATIENT)
Dept: SLEEP MEDICINE | Facility: MEDICAL CENTER | Age: 75
End: 2025-07-09
Attending: STUDENT IN AN ORGANIZED HEALTH CARE EDUCATION/TRAINING PROGRAM
Payer: MEDICARE

## 2025-07-09 DIAGNOSIS — R06.83 SNORING: ICD-10-CM

## 2025-07-09 PROCEDURE — 95800 SLP STDY UNATTENDED: CPT | Mod: 26 | Performed by: STUDENT IN AN ORGANIZED HEALTH CARE EDUCATION/TRAINING PROGRAM

## 2025-07-16 DIAGNOSIS — I10 ESSENTIAL HYPERTENSION: ICD-10-CM

## 2025-07-16 RX ORDER — LISINOPRIL 40 MG/1
TABLET ORAL
Qty: 90 TABLET | Refills: 3 | Status: SHIPPED | OUTPATIENT
Start: 2025-07-16

## 2025-07-16 NOTE — PROCEDURES
DIAGNOSTIC HOME SLEEP TEST (HST) REPORT WatchPAT      PATIENT ID:  NAME:  Ger Dover  MRN:               [id number]  YOB: 1950  DATE OF STUDY: 7/9/2025      Impression:     This study shows evidence of:      1. Severe obstructive sleep apnea with  4% PAT apnea hypopnea index(pAHI) of 37.4 per hour.  PAT respiratory disturbance index (pRDI) was 50.9 per hour. These findings are based on 7 channels recording of PAT signal with sleep staging, heart rate, pulse oximetry, actigraphy, body position, snoring and respiratory movement.     2. Oxygenation O2 Sat. mean O2 sat was 93%,  carrington was 84%,  and maximum O2 at 98 %. O2 sat was at or  below 88% for 1.7 min of evaluation time. Oxygen Desaturation (>=4%) Index was 38.1/hr. AVG HR was 51 BPM.      TECHNICAL DESCRIPTION: Patient underwent home sleep apnea testing with peripheral arterial tone signal (WatchPAT™). This is a Type IV portable monitor and device per Medicare. Monitoring was done with 7 channels recording of PAT signal with sleep staging, heart rate, pulse oximetry, actigraphy, body position, snoring and respiratory movement. Prior to using the device, the patient received verbal and written instructions for its application and was provided with the help desk phone number for additional telephonic instruction with 24-hour availability of qualified personnel to answer questions.    Respiratory events:          General sleep summary: . Total recording time is 8 hours and 3 minutes and total Sleep time is 7 hours and 9 minutes. The patient spent 12.5 minutes in the supine position and 417 minutes in the nonsupine position.      Recommendations:    1. CPAP titration study vs Auto CPAP trial.  If starting auto CPAP would recommend minimum pressure 5 cmH2O and maximum pressure 15 cmH2O.    2.  There is potential for slight elevation in central respiratory events indicated by pAHIc 12.1 events an hour.  Of these events potentially 25% were  Cheyne-Kim respirations.  Home studies can have a difficult time assessing for central respiratory events.  Majority of events still appear to be obstructive.    3. In general patients with sleep apnea are advised to avoid alcohol and sedatives and to not operate a motor vehicle while drowsy. In some cases alternative treatment options may prove effective in resolving sleep apnea in these options include upper airway surgery, the use of a dental orthotic or weight loss and positional therapy. Clinical correlation is required.

## 2025-07-16 NOTE — TELEPHONE ENCOUNTER
Received request via: Pharmacy    Was the patient seen in the last year in this department? Yes    Does the patient have an active prescription (recently filled or refills available) for medication(s) requested? No    Pharmacy Name: [facility] - [place], [place] - [address] [facility]     Does the patient have retirement Plus and need 100-day supply? (This applies to ALL medications) Patient does not have SCP